# Patient Record
Sex: FEMALE | Race: WHITE | Employment: FULL TIME | URBAN - METROPOLITAN AREA
[De-identification: names, ages, dates, MRNs, and addresses within clinical notes are randomized per-mention and may not be internally consistent; named-entity substitution may affect disease eponyms.]

---

## 2021-05-17 NOTE — PROGRESS NOTES
[facility]  [address], [address]  Phone: [phone]  Fax: [phone]        INITIAL CONSULTATION      HISTORY OF PRESENT ILLNESS:  Abdirahman Molina is a 42 y.o. female whom is referred from Dr. Tess De La Rosa secondary to chronic intermittent low back pain occasionally radiating into the BLE in a S1 distribution to the mid-thighs x 2012 after a sneeze, more constant x 2016. She rates her pain 6/10. Her pain is exacerbated by prolonged sitting and heavy lifting. Additionally, pt reports pain and paraesthesias with discoloration in the digits of the BUE. She has treated with Naprosyn, Lidoderm, flexeril, and Norco with benefit. She treated with Prednisone within the past year with benefit. Pt received a lumbar spinal injection in 2012 at the [facility] with benefit x 1 year. Pt received a lumbar spinal injection in 2016 by Dr. Forest Murrieta without benefit. Pt completed PT in 2016 with benefit. She is inconsistent with her HEP. Patient denies previous spinal surgery or recent physical therapy/chiropractic care. Pt denies fever, weight loss, or skin changes. She denies possibility of pregnancy or breastfeeding. PmHx of back pain, endometriosis, migraine HA's, PCOS. Note from Dr. Tess De La Rosa dated 4/19/2021 indicating patient was seen with c/o myalgias. Recently treated with a 10 days course for lymes disease. started her on diclofenac. Indicated pt had a positive ALYSON. Indicated she has h/o major depressive disorder. Note from Dr. Tess De La Rosa dated 5/4/2021 indicating patient was seen with c/o back pain. Pt had positive ALYSON but no CTD. Indicated she does not think she has an underlying connective tissue disorder. Pt received 2 spinal injections by Dr. Forest Murrieta with improvement. First injection was beneficial for several months. Pt reports the only thing that provides relief is hydrocodone. The patient is RHD.  reviewed. Body mass index is 39.87 kg/m².     PCP: Trung Richardson MD    Past Medical History:   Diagnosis Date    Anxiety     Back pain     Endometriosis     Irregular menses     Migraines     PCOS (polycystic ovarian syndrome)     Pelvic pain     Sleep apnea    l us  Past Surgical History:   Procedure Laterality Date    HX PELVIC LAPAROSCOPY     e: Yes     Alcohol/week: 1.0 standard drinks     Types: 1 Glasses of wine per week       Work status: The patient is employed. Marital status: . Current Outpatient Medications   Medication Sig Dispense Refill    naproxen sodium (Aleve) 220 mg cap Aleve   PRN      loratadine-pseudoephedrine (Claritin-D 12 Hour) 5-120 mg per tablet Claritin-D      lidocaine (LIDODERM) 5 % Apply 1 Patch to affected area as needed.  cyclobenzaprine (FLEXERIL) 10 mg tablet Take 10 mg by mouth every eight (8) hours as needed.  mirtazapine (REMERON) 15 mg tablet Take 15 mg by mouth At bedtime.  hydroCHLOROthiazide (HYDRODIURIL) 25 mg tablet Take 25 mg by mouth daily.  diclofenac EC (VOLTAREN) 75 mg EC tablet Take  by mouth.  HYDROcodone-acetaminophen (NORCO) 7.5-325 mg per tablet Take 1 Tab by mouth every six (6) hours as needed for Pain.          Allergies   Allergen Reactions    Penicillins Other (comments)     Facial swelling            Family History   Problem Relation Age of Onset    Diabetes Mother     Hypertension Mother     Thyroid Disease Mother     Heart Disease Father     Diabetes Father     Hypertension Father     Heart Disease Paternal Grandmother     Diabetes Paternal Grandmother     Hypertension Paternal Grandmother     Heart Disease Paternal Grandfather     Hypertension Paternal Grandfather          REVIEW OF SYSTEMS  Constitutional symptoms: Negative  Eyes: Negative  Ears, Nose, Throat, and Mouth: Negative  Cardiovascular: Negative  Respiratory: Negative  Genitourinary: Negative  Integumentary (Skin and/or breast): Negative  Musculoskeletal: Positive for low back pain. Pain and paraesthesias with discoloration in the BUE digits. Extremities: Negative for edema. Endocrine/Rheumatologic: Negative  Hematologic/Lymphatic: Negative  Allergic/Immunologic: Negative  Psychiatric: Negative       PHYSICAL EXAMINATION  Visit Vitals  BP (!) 138/100 (BP 1 Location: Left upper arm)   Pulse 87   Temp 99.2 °F (37.3 °C)   Resp 19   Ht 5' 1\" (1.549 m)   Wt 211 lb (95.7 kg)   SpO2 98%   BMI 39.87 kg/m²       CONSTITUTIONAL: NAD, A&O x 3  HEART: Regular rate and rhythm  GASTROINTESTINAL: Positive bowel sounds, soft, nontender, and nondistended  LUNGS: Clear to auscultation bilaterally. SKIN: Negative for rash. RANGE OF MOTION: The patient has full passive range of motion in all four extremities. SENSATION: Sensation is intact to light touch throughout. MOTOR:   Straight Leg Raise: Negative, bilateral  Harding: Negative, bilateral  Deep tendon reflexes are 1 at the biceps, 0 at the triceps, and 1 at the brachioradialis bilaterally. Deep tendon reflexes are 0 at the knees and ankles bilaterally. Shoulder AB/Flex Elbow Flex Wrist Ext Elbow Ext Wrist Flex Hand Intrin Tone   Right +4/5 +4/5 +4/5 +4/5 +4/5 +4/5 +4/5   Left +4/5 +4/5 +4/5 +4/5 +4/5 +4/5 +4/5              Hip Flex Knee Ext Knee Flex Ankle DF GTE Ankle PF Tone   Right +4/5 +4/5 +4/5 +4/5 +4/5 +4/5 +4/5   Left +4/5 +4/5 +4/5 +4/5 +4/5 +4/5 +4/5     RADIOGRAPHS  Lumbar spine plain films dated 5/19/2021. 2 views: AP and lateral. Revealed:  Mild disc space narrowing at L3-4, L4-5, and L5-S1. Small anterior osteophytes noted at L2 and L3. No acute pathology identified. ASSESSMENT   Diagnoses and all orders for this visit:    1. Low back pain at multiple sites  -     AMB POC XRAY, SPINE, LUMBOSACRAL; 2 O  -     REFERRAL TO PHYSICAL THERAPY  -     gabapentin (NEURONTIN) 300 mg capsule; Take 1 Capsule by mouth three (3) times daily.  Max Daily Amount: 900 mg.    2. Lumbar spondylosis with myelopathy  -     REFERRAL TO PHYSICAL THERAPY  -     gabapentin (NEURONTIN) 300 mg capsule; Take 1 Capsule by mouth three (3) times daily. Max Daily Amount: 900 mg.    3. DDD (degenerative disc disease), lumbar  -     REFERRAL TO PHYSICAL THERAPY  -     gabapentin (NEURONTIN) 300 mg capsule; Take 1 Capsule by mouth three (3) times daily. Max Daily Amount: 900 mg. IMPRESSIONS/RECOMMENDATIONS:  Patient presents today with c/o low back pain occasionally radiating into the BLE in a S1 distribution to the mid-thighs. Multiple treatment options were discussed. I will have the patient sign a release of medical information to obtain records from Dr. Frank Infante. I will try her on Neurontin 300 mg TID. The risks, benefits, and potential side effects of this medication were discussed. Patient understands and wishes to proceed. Patient advised to call the office if intolerant to new medication. I will refer her to physical therapy with an emphasis on HEP. Patient is neurologically intact. I will see the patient back in 6 week's time or earlier if needed. Written by Florian Fournier, as dictated by Johan Uriarte MD  I examined the patient, reviewed and agree with the note.

## 2021-05-19 ENCOUNTER — OFFICE VISIT (OUTPATIENT)
Dept: ORTHOPEDIC SURGERY | Age: 41
End: 2021-05-19
Payer: COMMERCIAL

## 2021-05-19 VITALS
TEMPERATURE: 99.2 F | SYSTOLIC BLOOD PRESSURE: 138 MMHG | RESPIRATION RATE: 19 BRPM | HEIGHT: 61 IN | HEART RATE: 87 BPM | DIASTOLIC BLOOD PRESSURE: 100 MMHG | OXYGEN SATURATION: 98 % | BODY MASS INDEX: 39.84 KG/M2 | WEIGHT: 211 LBS

## 2021-05-19 DIAGNOSIS — M51.36 DDD (DEGENERATIVE DISC DISEASE), LUMBAR: ICD-10-CM

## 2021-05-19 DIAGNOSIS — M54.50 LOW BACK PAIN AT MULTIPLE SITES: Primary | ICD-10-CM

## 2021-05-19 DIAGNOSIS — M47.16 LUMBAR SPONDYLOSIS WITH MYELOPATHY: ICD-10-CM

## 2021-05-19 PROCEDURE — 99204 OFFICE O/P NEW MOD 45 MIN: CPT | Performed by: PHYSICAL MEDICINE & REHABILITATION

## 2021-05-19 PROCEDURE — 72100 X-RAY EXAM L-S SPINE 2/3 VWS: CPT | Performed by: PHYSICAL MEDICINE & REHABILITATION

## 2021-05-19 RX ORDER — HYDROCHLOROTHIAZIDE 25 MG/1
25 TABLET ORAL DAILY
COMMUNITY

## 2021-05-19 RX ORDER — CYCLOBENZAPRINE HCL 10 MG
10 TABLET ORAL
COMMUNITY
Start: 2020-10-11

## 2021-05-19 RX ORDER — GABAPENTIN 300 MG/1
300 CAPSULE ORAL 3 TIMES DAILY
Qty: 90 CAPSULE | Refills: 1 | Status: SHIPPED | OUTPATIENT
Start: 2021-05-19

## 2021-05-19 RX ORDER — LORATADINE AND PSEUDOEPHEDRINE SULFATE 5; 120 MG/1; MG/1
TABLET, EXTENDED RELEASE ORAL
COMMUNITY

## 2021-05-19 RX ORDER — CHOLECALCIFEROL (VITAMIN D3) 125 MCG
CAPSULE ORAL
COMMUNITY

## 2021-05-19 RX ORDER — LIDOCAINE 50 MG/G
1 PATCH TOPICAL AS NEEDED
COMMUNITY

## 2021-05-19 RX ORDER — NORETHINDRONE ACETATE AND ETHINYL ESTRADIOL, ETHINYL ESTRADIOL AND FERROUS FUMARATE 1MG-10(24)
KIT ORAL
COMMUNITY
End: 2021-05-19 | Stop reason: ALTCHOICE

## 2021-05-19 RX ORDER — MIRTAZAPINE 15 MG/1
15 TABLET, FILM COATED ORAL AT BEDTIME
COMMUNITY

## 2021-05-19 RX ORDER — DICLOFENAC SODIUM 75 MG/1
TABLET, DELAYED RELEASE ORAL
COMMUNITY

## 2021-05-19 NOTE — LETTER
5/19/2021 Patient: Johana Nam YOB: 1980 Date of Visit: 5/19/2021 Fernanda Cadet MD 
[facility] [address] [id number] [address] [address] Via Fax: [phone] Kishor Ballesteros MD 
[facility] [address] [address] [address] [address] [address] Fax: [phone] Dear MD Kishor Salinas MD, Thank you for referring Ms. Serena Joseph to [address] for evaluation. My notes for this consultation are attached. If you have questions, please do not hesitate to call me. I look forward to following your patient along with you. Sincerely, Cisco Stark MD

## 2021-05-27 ENCOUNTER — HOSPITAL ENCOUNTER (OUTPATIENT)
Dept: PHYSICAL THERAPY | Age: 41
Discharge: HOME | End: 2021-05-27
Payer: COMMERCIAL

## 2021-05-27 PROCEDURE — 97535 SELF CARE MNGMENT TRAINING: CPT

## 2021-05-27 PROCEDURE — 97161 PT EVAL LOW COMPLEX 20 MIN: CPT

## 2021-05-27 PROCEDURE — 97110 THERAPEUTIC EXERCISES: CPT

## 2021-05-27 NOTE — PROGRESS NOTES
PT DAILY TREATMENT NOTE     Patient Name: Mi Costa  Date:2021  : 1980  [x]  Patient  Verified  Payor: Maya Anthony / Plan: [place] [employer] / Product Type: HMO /    In time:4:29  Out time:5:10  Total Treatment Time (min): 41  Visit #: 1 of 8    Treatment Area: Low back pain [M54.5]    SUBJECTIVE  Pain Level (0-10 scale): 0  Any medication changes, allergies to medications, adverse drug reactions, diagnosis change, or new procedure performed?: [x] No    [] Yes (see summary sheet for update)  Subjective functional status/changes:   [] No changes reported  The pt reports she has nearly abolished pain since starting Gabapentin    OBJECTIVE    21 min [x]Eval                  []Re-Eval       11 min Therapeutic Exercise:  [] See flow sheet :   Rationale: increase ROM and increase proprioception to improve the patients ability to perform daily tasks     9 min Therapeutic Activity:  []  See flow sheet :   Rationale: pt education and self care   to improve the patients ability to improve self care and management of symptoms         With   [] TE   [] TA   [] neuro   [] other: Patient Education: [x] Review HEP    [] Progressed/Changed HEP based on:   [] positioning   [] body mechanics   [] transfers   [] heat/ice application    [] other:      Other Objective/Functional Measures:      Pain Level (0-10 scale) post treatment: 1    ASSESSMENT/Changes in Function: see POC    Patient will continue to benefit from skilled PT services to modify and progress therapeutic interventions, address functional mobility deficits, address ROM deficits, address strength deficits, analyze and address soft tissue restrictions, analyze and cue movement patterns and analyze and modify body mechanics/ergonomics to attain remaining goals. [x]  See Plan of Care  []  See progress note/recertification  []  See Discharge Summary         Progress towards goals / Updated goals:  Short Term Goals: To be accomplished in 2 weeks:  1.  Pt will demonstrate I and compliance with HEP to promote self management of symptoms. IE: HEP issued and instructed  2. Pt will demonstrate ability to properly PPT without back pain or spasms to improve segmental mobility of lumbar spine. IE: pt reports fear due to previous back spasms  Long Term Goals: To be accomplished in 4 weeks:  1. Pt will demonstrate B hip abd and extension strength 4+/5 without back discomfort to improve ease of standing activity. IE: 4/5 abd  4/5 ext with back pain  2. Pt will demonstrate B JOHANNA position without hip pain to improve lumbopelvic flexibility with ADLs. IE: anterior thigh/hip discomfort  3. Pt will report ability to ambulate >30 minutes without increased back pain to return to walking regimen and improve functional capacity. IE: 20 minute ambulation tolerance  4. Pt will improve FOTO score to 50 to demonstrate improved quality of life and functional capacity.    IE: 28    PLAN  []  Upgrade activities as tolerated     [x]  Continue plan of care  []  Update interventions per flow sheet       []  Discharge due to:_  []  Other:_      Joe Anderson DPT, CMTPT 5/27/2021  5:19 PM    Future Appointments   Date Time Provider Parminder Saunders   6/8/2021  4:30 PM Brianna Escobedo, PTA [facility]   6/10/2021  5:15 PM Roseanna Colon, PTA [facility]   6/15/2021  4:30 PM Jesus Hernandez, PT [facility]   6/18/2021  4:30 PM Eduardo Zeng, PTA [facility]   6/22/2021  5:15 PM Lillian Heingy [facility]   6/24/2021  5:15 PM Grey, [address] [facility]   6/29/2021  4:30 PM Lillian Heingy [facility]   6/30/2021  9:30 AM Liborio Kent MD [facility]   7/1/2021  5:15 PM Lillian Buggy [facility]

## 2021-05-27 NOTE — PROGRESS NOTES
[facility]  [address]  [place], [address]  [phone] fax    Plan of Care/ Statement of Necessity for Physical Therapy Services    Patient name: Mulu Elena Start of Care: 2021   Referral source: Ivan Murillo MD : 1980    Medical Diagnosis: Low back pain [M54.5]  Payor: Keysha Gilliland / Plan: [address] HMO / Product Type: HMO /  Onset [id number]    Treatment Diagnosis: LBP   Prior Hospitalization: see medical history Provider#: [id number]   Medications: Verified on Patient summary List    Comorbidities: Fibromyalgia, depression, arthritis, HTN   Prior Level of Function: Pt with chronic LBP since 2012 limiting activity participation and ADL ease     The Plan of Care and following information is based on the information from the initial evaluation. Assessment/ key information: The pt is a 43 y/o F presenting with c/o LBP of chronic nature. She reports herniating lower 3 lumbar discs following a sneeze in . The pt has had injections with relief in the past, reports fear avoidance with regards to activity due to long nature of her back pain and spasms. She reports start of Gabapentin about 10 days ago that has nearly abolished her symptoms. She does still have some \"soreness\" across her lower lumbar region. Pt reports usually limited lifting, ambulating and sitting ability secondary to back pain. She demonstrates WNL trunk AROM today without issue. Mild tightness into hip ER bilaterally. TTP across lower lumbar/SI region bilaterally. LBP noted with hip extension strength testing and mild hip pain with hip abduction strength testing. The pt presents with signs and symptoms consistent with mechanical back pain. She would benefit from PT to improve pain, strength and functional mechanics to improve overall activity participation and quality of life.     Evaluation Complexity History HIGH Complexity :3+ comorbidities / personal factors will impact the outcome/ POC ; Examination LOW Complexity : 1-2 Standardized tests and measures addressing body structure, function, activity limitation and / or participation in recreation  ;Presentation LOW Complexity : Stable, uncomplicated  ;Clinical Decision Making MEDIUM Complexity : FOTO score of 26-74  Overall Complexity Rating: LOW   Problem List: pain affecting function, decrease ROM, decrease strength, decrease ADL/ functional abilitiies, decrease activity tolerance and decrease flexibility/ joint mobility   Treatment Plan may include any combination of the following: Therapeutic exercise, Therapeutic activities, Neuromuscular re-education, Physical agent/modality, Manual therapy, Patient education, Self Care training and Functional mobility training  Patient / Family readiness to learn indicated by: asking questions, trying to perform skills and interest  Persons(s) to be included in education: patient (P)  Barriers to Learning/Limitations: yes; time  Patient Goal (s): Get back to activity without a setback  Patient Self Reported Health Status: fair  Rehabilitation Potential: good    Short Term Goals: To be accomplished in 2 weeks:  1. Pt will demonstrate I and compliance with HEP to promote self management of symptoms. 2. Pt will demonstrate ability to properly PPT without back pain or spasms to improve segmental mobility of lumbar spine. Long Term Goals: To be accomplished in 4 weeks:  1. Pt will demonstrate B hip abd and extension strength 4+/5 without back discomfort to improve ease of standing activity. 2. Pt will demonstrate B JOHANNA position without hip pain to improve lumbopelvic flexibility with ADLs. 3. Pt will report ability to ambulate >30 minutes without increased back pain to return to walking regimen and improve functional capacity. 4. Pt will improve FOTO score to 50 to demonstrate improved quality of life and functional capacity.     Frequency / Duration: Patient to be seen 2 times per week for 4 weeks. Patient/ Caregiver education and instruction: Diagnosis, prognosis, self care, activity modification and exercises   [x]  Plan of care has been reviewed with EVAN CHAHALT, CMTPT 5/27/2021 5:19 PM    ________________________________________________________________________    I certify that the above Therapy Services are being furnished while the patient is under my care. I agree with the treatment plan and certify that this therapy is necessary.     [address] [place] [address] Signature:____________Date:_________TIME:________     Edison Gracia MD  ** Signature, Date and Time must be completed for valid certification **    Please sign and return to [facility] [address] [place] [address] & [facility]  [address] [place] [address] [address]  [phone] fax

## 2021-06-08 ENCOUNTER — APPOINTMENT (OUTPATIENT)
Dept: PHYSICAL THERAPY | Age: 41
End: 2021-06-08
Payer: COMMERCIAL

## 2021-06-10 ENCOUNTER — APPOINTMENT (OUTPATIENT)
Dept: PHYSICAL THERAPY | Age: 41
End: 2021-06-10
Payer: COMMERCIAL

## 2021-06-15 ENCOUNTER — HOSPITAL ENCOUNTER (OUTPATIENT)
Dept: PHYSICAL THERAPY | Age: 41
Discharge: HOME | End: 2021-06-15
Payer: COMMERCIAL

## 2021-06-15 PROCEDURE — 97110 THERAPEUTIC EXERCISES: CPT

## 2021-06-15 PROCEDURE — 97112 NEUROMUSCULAR REEDUCATION: CPT

## 2021-06-15 NOTE — PROGRESS NOTES
PT DAILY TREATMENT NOTE     Patient Name: Jose Carrion  Date:6/15/2021  : 1980  [x]  Patient  Verified  Payor: John Anaya / Plan: [employer] / Product Type: HMO /    In time: 4:31  Out time:5:29  Total Treatment Time (min): 62  Visit #: 2 of 8      Treatment Area: Low back pain [M54.5]    SUBJECTIVE  Pain Level (0-10 scale): 1-2  Any medication changes, allergies to medications, adverse drug reactions, diagnosis change, or new procedure performed?: [x] No    [] Yes (see summary sheet for update)  Subjective functional status/changes:   [] No changes reported  Pt reports she does not have a lot of pain right now because she was prescribed Gabapentin and it has really taken away a lot of her pain. OBJECTIVE    Modality rationale: decrease inflammation and decrease pain to improve the patients ability to preform daily task with ease.    Min Type Additional Details    [] Estim:  []Unatt       []IFC  []Premod                        []Other:  []w/ice   []w/heat  Position:  Location:    [] Estim: []Att    []TENS instruct  []NMES                    []Other:  []w/US   []w/ice   []w/heat  Position:  Location:    []  Traction: [] Cervical       []Lumbar                       [] Prone          []Supine                       []Intermittent   []Continuous Lbs:  [] before manual  [] after manual    []  Ultrasound: []Continuous   [] Pulsed                           []1MHz   []3MHz W/cm2:  Location:    []  Iontophoresis with dexamethasone         Location: [] Take home patch   [] In clinic   10 [x]  Ice     []  heat  []  Ice massage  []  Laser   []  Anodyne Position:prone  Location: L/S    []  Laser with stim  []  Other:  Position:  Location:    []  Vasopneumatic Device  Pre-treatment girth:  Post-treatment girth:  Measured at (location):  Pressure:       [] lo [] med [] hi   Temperature: [] lo [] med [] hi   [] Skin assessment post-treatment:  []intact []redness- no adverse reaction    []redness - adverse reaction:         33 min Therapeutic Exercise:  [x] See flow sheet :   Rationale: increase ROM, increase strength and improve coordination to improve the patients ability to perform functional task with ease. 15 min Neuromuscular Re-education:  [x]  See flow sheet :   Rationale: increase strength, improve coordination and increase proprioception  to improve the patients ability to perform functional task with ease. With   [] TE   [] TA   [] neuro   [] other: Patient Education: [x] Review HEP    [] Progressed/Changed HEP based on:   [] positioning   [] body mechanics   [] transfers   [] heat/ice application    [] other:      Other Objective/Functional Measures: initiated therex per flowsheet      Pain Level (0-10 scale) post treatment: 0-1 (sore)    ASSESSMENT/Changes in Function:   First f/u session with pt displaying a good tolerance to therex and putting forth good effort with exercises. Pt reports some fear avoidance with exercising due to the medication she is currently taking (gabepentin) masking what she feels is her true pain and true tolerance to that pain. Pt reports non compliance to the HEP and was encouraged to initiate her HEP and to use it as her baseline along with in clinical therapy as to what she is able to tolerate. Pt challenged with S/L clams noting soreness and fatigue in the B hips but no increasing pain in the L/S. Pt reports no increasing L/S pain with therex. Patient will continue to benefit from skilled PT services to modify and progress therapeutic interventions, address functional mobility deficits, address ROM deficits, address strength deficits, analyze and address soft tissue restrictions, analyze and cue movement patterns, analyze and modify body mechanics/ergonomics and assess and modify postural abnormalities to attain remaining goals.      [x]  See Plan of Care  []  See progress note/recertification  []  See Discharge Summary                Progress towards goals / Updated goals:  Short Term Goals: To be accomplished in 2 weeks:  1. Pt will demonstrate I and compliance with HEP to promote self management of symptoms. IE: HEP issued and instructed  Current: not met 6/15/21   2. Pt will demonstrate ability to properly PPT without back pain or spasms to improve segmental mobility of lumbar spine. IE: pt reports fear due to previous back spasms  Long Term Goals: To be accomplished in 4 weeks:  1. Pt will demonstrate B hip abd and extension strength 4+/5 without back discomfort to improve ease of standing activity. IE: 4/5 abd  4/5 ext with back pain  2. Pt will demonstrate B JOHANNA position without hip pain to improve lumbopelvic flexibility with ADLs. IE: anterior thigh/hip discomfort  3. Pt will report ability to ambulate >30 minutes without increased back pain to return to walking regimen and improve functional capacity. IE: 20 minute ambulation tolerance  4. Pt will improve FOTO score to 50 to demonstrate improved quality of life and functional capacity.               IE: 28    PLAN  []  Upgrade activities as tolerated     [x]  Continue plan of care  []  Update interventions per flow sheet       []  Discharge due to:_  []  Other:_      Kuldeep Ruggiero, PTA 6/15/2021  4:59 PM    Future Appointments   Date Time Provider Parminder Nicky   6/18/2021  4:30 PM Taylor Mcdermott, PTA [facility]   6/22/2021  5:15 PM Jacklyn Castillo PTA [facility]   6/24/2021  5:15 PM Stacy Lynch [facility]   6/29/2021  4:30 PM Stacy Lynch [facility]   6/30/2021  9:30 AM Lydia Gonzales MD [facility]   7/1/2021  5:15 PM Catina Razo, PTA [facility]

## 2021-06-18 ENCOUNTER — HOSPITAL ENCOUNTER (OUTPATIENT)
Dept: PHYSICAL THERAPY | Age: 41
Discharge: HOME | End: 2021-06-18
Payer: COMMERCIAL

## 2021-06-18 PROCEDURE — 97110 THERAPEUTIC EXERCISES: CPT

## 2021-06-18 PROCEDURE — 97112 NEUROMUSCULAR REEDUCATION: CPT

## 2021-06-18 NOTE — PROGRESS NOTES
PT DAILY TREATMENT NOTE     Patient Name: Dejah Pérez  Date:2021  : 1980  [x]  Patient  Verified  Payor: Zhao Lynch / Plan: [employer] / Product Type: HMO /    In time:4:33  Out time:5:19  Total Treatment Time (min): 55  Visit #: 3 of 8    Treatment Area: Low back pain [M54.5]    SUBJECTIVE  Pain Level (0-10 scale): 1  Any medication changes, allergies to medications, adverse drug reactions, diagnosis change, or new procedure performed?: [x] No    [] Yes (see summary sheet for update)  Subjective functional status/changes:   [] No changes reported  Pt reports she was sore in the low back after her last visit but she is feeling better today. OBJECTIVE    Modality rationale: decrease inflammation and decrease pain to improve the patients ability to perform ADL's with ease.    Min Type Additional Details    [] Estim:  []Unatt       []IFC  []Premod                        []Other:  []w/ice   []w/heat  Position:  Location:    [] Estim: []Att    []TENS instruct  []NMES                    []Other:  []w/US   []w/ice   []w/heat  Position:  Location:    []  Traction: [] Cervical       []Lumbar                       [] Prone          []Supine                       []Intermittent   []Continuous Lbs:  [] before manual  [] after manual    []  Ultrasound: []Continuous   [] Pulsed                           []1MHz   []3MHz W/cm2:  Location:    []  Iontophoresis with dexamethasone         Location: [] Take home patch   [] In clinic   10 [x]  Ice     []  heat  []  Ice massage  []  Laser   []  Anodyne Position:prone  Location:L/S    []  Laser with stim  []  Other:  Position:  Location:    []  Vasopneumatic Device    []  Right     []  Left  Pre-treatment girth:  Post-treatment girth:  Measured at (location):  Pressure:       [] lo [] med [] hi   Temperature: [] lo [] med [] hi   [] Skin assessment post-treatment:  []intact []redness- no adverse reaction    []redness - adverse reaction:         21 min Therapeutic Exercise:  [x] See flow sheet :   Rationale: increase ROM and increase strength to improve the patients ability to perform functional task with ease. 15 min Neuromuscular Re-education:  [x]  See flow sheet :   Rationale: increase strength, improve coordination and increase proprioception  to improve the patients ability to perform ADL's with ease. With   [] TE   [] TA   [] neuro   [] other: Patient Education: [x] Review HEP    [] Progressed/Changed HEP based on:   [] positioning   [] body mechanics   [] transfers   [] heat/ice application    [] other:      Other Objective/Functional Measures: S/L exercises performed with a pillow under hip for comfort. Pain Level (0-10 scale) post treatment: 1-2 (sore)     ASSESSMENT/Changes in Function:   Improved form with hip hinges this visit with pt displaying increased mobility and ease with exercise. Pt challenged with 1/2 prone hip ext and DK's noting increased discomfort and instability at the B ankles (left>right) but able to perform as prescribed. Improved PPT mechanics noted this visit with pt reporting no pain with exercises. Pt reports she has initiated her HEP and has no questions as this time. Pt reports increased soreness but no increased pain post treatment. Patient will continue to benefit from skilled PT services to modify and progress therapeutic interventions, address functional mobility deficits, address ROM deficits, address strength deficits, analyze and address soft tissue restrictions, analyze and cue movement patterns, analyze and modify body mechanics/ergonomics and assess and modify postural abnormalities to attain remaining goals. [x]  See Plan of Care  []  See progress note/recertification  []  See Discharge Summary         Progress towards goals / Updated goals:  Short Term Goals: To be accomplished in 2 weeks:  1.  Pt will demonstrate I and compliance with HEP to promote self management of symptoms.             BM: HEP issued and instructed  Current: not met 6/15/21   2. Pt will demonstrate ability to properly PPT without back pain or spasms to improve segmental mobility of lumbar spine.              IE: pt reports fear due to previous back spasms  Current: Met 6/18/21 Pt able to PPT with no reported pain for 10 reps  Long Term Goals: To be accomplished in 4 weeks:  1. Pt will demonstrate B hip abd and extension strength 4+/5 without back discomfort to improve ease of standing activity.             ANDREA: 4/5 abd  4/5 ext with back pain  2. Pt will demonstrate B JOHANNA position without hip pain to improve lumbopelvic flexibility with ADLs.             BW: anterior thigh/hip discomfort  3. Pt will report ability to ambulate >30 minutes without increased back pain to return to walking regimen and improve functional capacity.             IE: 20 minute ambulation tolerance  4. Pt will improve FOTO score to 50 to demonstrate improved quality of life and functional capacity.               NN: 55    BPIJ  []  Upgrade activities as tolerated     [x]  Continue plan of care  []  Update interventions per flow sheet       []  Discharge due to:_  []  Other:_      Beny Buenrostro PTA 6/18/2021  4:31 PM    Future Appointments   Date Time Provider Parminder Saunders   6/22/2021  5:15 PM Chanel Rocha PTA [facility]   6/24/2021  5:15 PM Mariusz Barnett [facility]   6/29/2021  4:30 PM Mariusz Barnett [facility]   6/30/2021  9:30 AM Edith Saucedo MD [facility]   7/1/2021  5:15 PM Beck Guerrero PTA [facility]

## 2021-06-22 ENCOUNTER — HOSPITAL ENCOUNTER (OUTPATIENT)
Dept: PHYSICAL THERAPY | Age: 41
Discharge: HOME | End: 2021-06-22
Payer: COMMERCIAL

## 2021-06-22 PROCEDURE — 97112 NEUROMUSCULAR REEDUCATION: CPT

## 2021-06-22 PROCEDURE — 97110 THERAPEUTIC EXERCISES: CPT

## 2021-06-22 NOTE — PROGRESS NOTES
PT DAILY TREATMENT NOTE     Patient Name: Rona Mart  Date:2021  : 1980  [x]  Patient  Verified  Payor: Gwendalyn Nageotte / Plan: [employer] / Product Type: HMO /    In time:5:15  Out time:6:06  Total Treatment Time (min): 51  Visit #: 4 of 8    Treatment Area: Low back pain [M54.5]    SUBJECTIVE  Pain Level (0-10 scale): 3/10  Any medication changes, allergies to medications, adverse drug reactions, diagnosis change, or new procedure performed?: [x] No    [] Yes (see summary sheet for update)  Subjective functional status/changes:   [] No changes reported  \"Today was a rough day, wore the wrong shoes at work. \"    OBJECTIVE    Modality rationale: decrease pain to improve the patients ability to perform ADL's.    Min Type Additional Details    [] Estim:  []Unatt       []IFC  []Premod                        []Other:  []w/ice   []w/heat  Position:  Location:    [] Estim: []Att    []TENS instruct  []NMES                    []Other:  []w/US   []w/ice   []w/heat  Position:  Location:    []  Traction: [] Cervical       []Lumbar                       [] Prone          []Supine                       []Intermittent   []Continuous Lbs:  [] before manual  [] after manual    []  Ultrasound: []Continuous   [] Pulsed                           []1MHz   []3MHz W/cm2:  Location:    []  Iontophoresis with dexamethasone         Location: [] Take home patch   [] In clinic   10 [x]  Ice     []  heat  []  Ice massage  []  Laser   []  Anodyne Position: Prone  Location: Low back    []  Laser with stim  []  Other:  Position:  Location:    []  Vasopneumatic Device    []  Right     []  Left  Pre-treatment girth:  Post-treatment girth:  Measured at (location):  Pressure:       [] lo [] med [] hi   Temperature: [] lo [] med [] hi   [] Skin assessment post-treatment:  []intact []redness- no adverse reaction    []redness - adverse reaction:      25 min Therapeutic Exercise:  [x] See flow sheet :   Rationale: increase ROM and increase strength to improve the patients ability to perform ADL's.    16 min Neuromuscular Re-education:  [x]  See flow sheet : Hip hinges, Trapeze bar series, hook-lying bridges. Rationale: increase strength, improve coordination and increase proprioception  to improve the patients ability to perform functional activities. With   [x] TE   [] TA   [] neuro   [] other: Patient Education: [x] Review HEP    [] Progressed/Changed HEP based on:   [] positioning   [] body mechanics   [] transfers   [] heat/ice application    [] other:       Other Objective/Functional Measures: Cueing for proper core and glute activation with exercises. Added trapeze bar hip 3-way. Pain Level (0-10 scale) post treatment: 0/10    ASSESSMENT/Changes in Function: Pt reports ambulation tolerance of 20', no change at this time. Pt fully participated in treatment and is motivated. Pt presents to be very aware of posture and core. Continue PT to increase core and (B) hip strength to improve standing/walking tolerance and ease of performing functional activities. Patient will continue to benefit from skilled PT services to modify and progress therapeutic interventions, address functional mobility deficits, address ROM deficits, address strength deficits, analyze and cue movement patterns and analyze and modify body mechanics/ergonomics to attain remaining goals. [x]  See Plan of Care  []  See progress note/recertification  []  See Discharge Summary         Progress towards goals / Updated goals:  Short Term Goals: To be accomplished in 2 weeks:  1. Pt will demonstrate I and compliance with HEP to promote self management of symptoms.             HX: HEP issued and instructed  Current: not met 6/15/21; Initiated this morning. 6/22/2021  2.  Pt will demonstrate ability to properly PPT without back pain or spasms to improve segmental mobility of lumbar spine.              IE: pt reports fear due to previous back spasms  Current: Met 6/18/21 Pt able to PPT with no reported pain for 10 reps. Long Term Goals: To be accomplished in 4 weeks:  1. Pt will demonstrate B hip abd and extension strength 4+/5 without back discomfort to improve ease of standing activity.             XN: 4/5 abd  4/5 ext with back pain  2. Pt will demonstrate B JOHANNA position without hip pain to improve lumbopelvic flexibility with ADLs.             ZP: anterior thigh/hip discomfort  3. Pt will report ability to ambulate >30 minutes without increased back pain to return to walking regimen and improve functional capacity.             RP: 20 minute ambulation tolerance   Current: Pt reports ambulation tolerance of 20', no change at this time. 6/22/2021  4. Pt will improve FOTO score to 50 to demonstrate improved quality of life and functional capacity.               HB: 49    NAGK  []  Upgrade activities as tolerated     [x]  Continue plan of care  []  Update interventions per flow sheet       []  Discharge due to:_  []  Other:_      Marcus Chatman PTA 6/22/2021  5:07 PM    Future Appointments   Date Time Provider Parminder Saunders   6/22/2021  5:15 PM Brianna Escobedo, PTA [facility]   6/24/2021  5:15 PM Lillian Echeverria [facility]   6/29/2021  4:30 PM Lillian Echeverria [facility]   6/30/2021  9:30 AM Liborio Kent MD [facility]   7/1/2021  5:15 PM Roseanna Colon, PTA [facility]

## 2021-06-24 ENCOUNTER — HOSPITAL ENCOUNTER (OUTPATIENT)
Dept: PHYSICAL THERAPY | Age: 41
Discharge: HOME | End: 2021-06-24
Payer: COMMERCIAL

## 2021-06-24 PROCEDURE — 97112 NEUROMUSCULAR REEDUCATION: CPT

## 2021-06-24 PROCEDURE — 97110 THERAPEUTIC EXERCISES: CPT

## 2021-06-24 NOTE — PROGRESS NOTES
PT DAILY TREATMENT NOTE     Patient Name: Rona Sawyerfer  Date:2021  : 1980  [x]  Patient  Verified  Payor: Gwendalyn Nageotte / Plan: [employer] / Product Type: HMO /    In time:5:14  Out time:6:03  Total Treatment Time (min): 49  Visit #: 5 of 8    Treatment Area: Low back pain [M54.5]    SUBJECTIVE  Pain Level (0-10 scale): 1/10  Any medication changes, allergies to medications, adverse drug reactions, diagnosis change, or new procedure performed?: [x] No    [] Yes (see summary sheet for update)  Subjective functional status/changes:   [] No changes reported  Pt reports that her back is in minimal pain today because she wore the right shoes to work today. She overall feels she needs to work on her core strength and mobility, but still feels like she is progressing through treatment. Pt  attributes use of gabapetin for major progression of hip/lumbar mobility. She discussed having water retention from sudden increased HTN that has for the most part been managed. However, her back pain and HTN had inhibited her from exercising to lose weight. OBJECTIVE    Modality rationale: decrease inflammation and decrease pain to improve the patients ability to perform ADLs.     Min Type Additional Details    [] Estim:  []Unatt       []IFC  []Premod                        []Other:  []w/ice   []w/heat  Position:  Location:    [] Estim: []Att    []TENS instruct  []NMES                    []Other:  []w/US   []w/ice   []w/heat  Position:  Location:    []  Traction: [] Cervical       []Lumbar                       [] Prone          []Supine                       []Intermittent   []Continuous Lbs:  [] before manual  [] after manual    []  Ultrasound: []Continuous   [] Pulsed                           []1MHz   []3MHz W/cm2:  Location:    []  Iontophoresis with dexamethasone         Location: [] Take home patch   [] In clinic   10 [x]  Ice     []  heat  []  Ice massage  []  Laser   []  Anodyne Position: sitting  Location: B hips    []  Laser with stim  []  Other:  Position:  Location:    []  Vasopneumatic Device    []  Right     []  Left  Pre-treatment girth:  Post-treatment girth:  Measured at (location):  Pressure:       [] lo [] med [] hi   Temperature: [] lo [] med [] hi   [] Skin assessment post-treatment:  []intact []redness- no adverse reaction    []redness - adverse reaction:     24 min Therapeutic Exercise:  [] See flow sheet :   Rationale: increase ROM and increase strength to improve the patients ability to perform ADLs    15 min Neuromuscular Re-education:  []  See flow sheet : trap bar, supine PPT series    Rationale: improve coordination and improve balance  to improve the patients ability to perform functional activities with good core stability. With   [] TE   [] TA   [] neuro   [] other: Patient Education: [x] Review HEP    [] Progressed/Changed HEP based on:   [] positioning   [] body mechanics   [] transfers   [] heat/ice application    [] other:      Other Objective/Functional Measures:   B hip ABD- 5/5  B hip ext- 4+/5   Right JOHANNA less ER than left, void of hip anterior pain  FOTO:      Pain Level (0-10 scale) post treatment: 0/10     ASSESSMENT/Changes in Function: Pt progressed through session well with no cueing needed for any exercises. Her hip strength and flexibility has significantly improved since initial eval. Pt c/o hip pain with single stance exercises, especially with hip extension. Pt will benefit from skilled PT to improve core stability and hip strength and flexibility to improve walking tolerance and ease of ADLs. Patient will continue to benefit from skilled PT services to modify and progress therapeutic interventions, address functional mobility deficits, address ROM deficits, address strength deficits, analyze and address soft tissue restrictions, analyze and cue movement patterns and analyze and modify body mechanics/ergonomics to attain remaining goals. [x]  See Plan of Care  []  See progress note/recertification  []  See Discharge Summary         Progress towards goals / Updated goals:  Short Term Goals: To be accomplished in 2 weeks:  1. Pt will demonstrate I and compliance with HEP to promote self management of symptoms.             LA: HEP issued and instructed  Current: not met 6/15/21; Initiated this morning. 6/22/2021  2. Pt will demonstrate ability to properly PPT without back pain or spasms to improve segmental mobility of lumbar spine.              IE: pt reports fear due to previous back spasms  Current: Met 6/18/21 Pt able to PPT with no reported pain for 10 reps. Long Term Goals: To be accomplished in 4 weeks:  1. Pt will demonstrate B hip abd and extension strength 4+/5 without back discomfort to improve ease of standing activity.             UZ: 4/5 abd  4/5 ext with back pain   Current: Met 5/5 abd and 4+/5 ext without back pain 6/23/2021  2. Pt will demonstrate B JOHANNA position without hip pain to improve lumbopelvic flexibility with ADLs.             TE: anterior thigh/hip discomfort   Current: right hip flexor tighter than left void of anterior hip pain 6/24/2021  3. Pt will report ability to ambulate >30 minutes without increased back pain to return to walking regimen and improve functional capacity.             QB: 20 minute ambulation tolerance              Current: Pt reports ambulation tolerance of 20', no change at this time. 6/22/2021  4. Pt will improve FOTO score to 50 to demonstrate improved quality of life and functional capacity.               WJ: 35   Current: Met 55 6/24/2021    PLAN  [x]  Upgrade activities as tolerated     [x]  Continue plan of care  []  Update interventions per flow sheet       []  Discharge due to:_  []  Other:_      Wendy Del Angel, SPT 6/24/2021  5:21 PM    Future Appointments   Date Time Provider Parminder Saunders   6/29/2021  4:30 PM Rita [facility]   6/30/2021  9:30 AM Rochelle Taylor, MD [facility]   7/1/2021  5:15 PM Latha Ramsey, PTA [facility]

## 2021-06-24 NOTE — PROGRESS NOTES
[facility]  [address]  [place], [address]  [phone] fax    Physical Therapy Progress Note  Patient name: Michelle Buckley Start of Care: 2021   Referral source: Ila Hayes MD : 1980                Medical Diagnosis: Low back pain [M54.5]  Payor: Leona Turner / Plan: [employer] HMO / Product Type: HMO /  Onset REUD:[id number]                Treatment Diagnosis: LBP   Prior Hospitalization: see medical history Provider#: [id number]   Medications: Verified on Patient summary List    Comorbidities: Fibromyalgia, depression, arthritis, HTN   Prior Level of Function: Pt with chronic LBP since  limiting activity participation and ADL ease  Visits from Start of Care: 5    Missed Visits: 0      Established Goals:        Excellent         Good         Limited            None  [x] Increased ROM   []  [x]  []  []  [x] Increased Strength  []  [x]  []  []  [x] Increased Mobility  []  [x]  []  []   [x] Decreased Pain   []  [x]  []  []  [] Decreased Swelling  []  []  []  []    Key Functional Changes: Short Term Goals: To be accomplished in 2 weeks:  1. Pt will demonstrate I and compliance with HEP to promote self management of symptoms.             VX: HEP issued and instructed  Current: not met 6/15/21; Initiated this morning. 2021  2. Pt will demonstrate ability to properly PPT without back pain or spasms to improve segmental mobility of lumbar spine.              IE: pt reports fear due to previous back spasms  Current: Met 21 Pt able to PPT with no reported pain for 10 reps. Long Term Goals: To be accomplished in 4 weeks:  1. Pt will demonstrate B hip abd and extension strength 4+/5 without back discomfort to improve ease of standing activity.             ND: 4/5 abd  4/5 ext with back pain              Current: Met 5/5 abd and 4+/5 ext without back pain 2021  2.  Pt will demonstrate B JOHANNA position without hip pain to improve lumbopelvic flexibility with ADLs.             VS: anterior thigh/hip discomfort              Current: right hip flexor tighter than left void of anterior hip pain 6/24/2021  3. Pt will report ability to ambulate >30 minutes without increased back pain to return to walking regimen and improve functional capacity.             OX: 20 minute ambulation tolerance              Current: Pt reports ambulation tolerance of 20', no change at this time. 6/22/2021  4. Pt will improve FOTO score to 50 to demonstrate improved quality of life and functional capacity.             JA: 35              Current: Met 55 6/24/2021    Updated Goals: to be achieved in 4 weeks:  1. Pt will report ability to ambulate >30 minutes without increased back pain to return to walking regimen and improve functional capacity.             AC: Pt reports ambulation tolerance of 20', no change at this time. 2.  Pt will demonstrate B JOHANNA position with full hip ER without pain to improve lumbopelvic flexibility for ADLs. PN: Right ER less than Left ER void of anterior hip pain   3. Pt will perform 15 leg lowers with 2 red bands in reformer void of anterior pelvic tilt and pain to improve core stability for ease of functional activities. PN: reports difficulty with maintaining core activation with therex   4. Pt will perform 15 trap bar marches bilaterally without hip pain to improve weightbearing tolerance of LE for ease of community ambulation. PN: reports hip pain on WB leg     ASSESSMENT/RECOMMENDATIONS: Pt needs minimal to no cueing throughout therex. Her hip strength and flexibility has significantly improved since initial eval. Pt c/o hip pain with single stance exercises, especially with hip extension. Pt will benefit from skilled PT to improve core stability and hip strength and flexibility to improve walking tolerance and ease of ADLs.      [x]Continue therapy per initial plan/protocol at a frequency of  2 x per week for 4 weeks  []Continue therapy with the following recommended changes:_____________________      _____________________________________________________________________  []Discontinue therapy progressing towards or have reached established goals  []Discontinue therapy due to lack of appreciable progress towards goals  []Discontinue therapy due to lack of attendance or compliance  []Await Physician's recommendations/decisions regarding therapy  []Other:________________________________________________________________    Thank you for this referral.    DEAN Cuevas 6/24/2021 6:19 PM  NOTE TO PHYSICIAN:  PLEASE COMPLETE THE ORDERS BELOW AND   FAX TO [facility]: [phone]  If you are unable to process this request in 24 hours please contact our office: [phone]    ? I have read the above report and request that my patient continue as recommended. ? I have read the above report and request that my patient continue therapy with the following changes/special instructions:__________________________________________________________  ? I have read the above report and request that my patient be discharged from therapy.     Physicians signature: ______________________________Date: ______Time:______     Inderjit Appiah MD

## 2021-06-29 ENCOUNTER — HOSPITAL ENCOUNTER (OUTPATIENT)
Dept: PHYSICAL THERAPY | Age: 41
Discharge: HOME | End: 2021-06-29
Payer: COMMERCIAL

## 2021-06-29 PROCEDURE — 97112 NEUROMUSCULAR REEDUCATION: CPT

## 2021-06-29 PROCEDURE — 97110 THERAPEUTIC EXERCISES: CPT

## 2021-06-29 NOTE — PROGRESS NOTES
[facility]  [address], [place]   Phone: [phone]  Fax: [phone]        PROGRESS NOTE      HISTORY OF PRESENT ILLNESS:  The patient is a 42 y.o. female and was seen today for follow up of chronic intermittent low back pain occasionally radiating into the BLE in a S1 distribution to the mid-thighs x 2012 after a sneeze, more constant x 2016. Her pain is exacerbated by prolonged sitting and heavy lifting. Additionally, pt reports pain and paraesthesias with discoloration in the digits of the BUE. She has treated with Naprosyn, Lidoderm, flexeril, and Norco with benefit. She treated with Prednisone within the past year with benefit. Pt received a lumbar spinal injection in 2012 at the [facility] with benefit x 1 year. Pt received a lumbar spinal injection in 2016 by Dr. Jacky Burgos without benefit. Pt completed PT in 2016 with benefit. She is inconsistent with her HEP. Patient denies previous spinal surgery or recent physical therapy/chiropractic care. Pt denies fever, weight loss, or skin changes. She denies possibility of pregnancy or breastfeeding. The patient is RHD. PmHx of back pain, endometriosis, migraine HA's, PCOS. Note from Dr. Yong Primrose dated 4/19/2021 indicating patient was seen with c/o myalgias. Recently treated with a 10 days course for lymes disease. started her on diclofenac. Indicated pt had a positive ALYSON. Indicated she has h/o major depressive disorder. Note from Dr. Yong Primrose dated 5/4/2021 indicating patient was seen with c/o back pain. Pt had positive ALYSON but no CTD. Indicated she does not think she has an underlying connective tissue disorder. Pt received 2 spinal injections by Dr. Jacky Burgos with improvement. First injection was beneficial for several months. Pt reports the only thing that provides relief is hydrocodone.  Lumbar spine plain films dated 5/19/2021. 2 views: AP and lateral. Revealed: Mild disc space narrowing at L3-4, L4-5, and L5-S1. Small anterior osteophytes noted at L2 and L3. No acute pathology identified. At her last clinical appointment, I had the patient sign a release of medical information to obtain records from Dr. Nadya Garvin. I tried her on Neurontin 300 mg TID. I referred her to physical therapy with an emphasis on HEP. The patient returns today and reports pain location and distribution remains unchanged. She rates her pain 1/10, previously 6/10. Therapy notes reviewed. Pt is currently enrolled in PT with benefit. She is tolerating the Neurontin 300 mg TID with benefit. Pt admits to occasionally taking the Neurontin BID. Pt reports she has an appointment on 10/12/2021 for possible pregnancy planning. Pt denies change in bowel or bladder habits. Note from Dr. Nadya Garvin dated 10/4/2013 indicating patient was seen with c/o left buttocks pain anterior thigh. Previously followed by [facility] pain management. Previous epidurals at [facility] with benefit x 6 months. Note from Dr. Nadya Garvin dated 10/14/2013 indicating patient underwent caudal block. Note from Dr. Nadya Garvin dated 10/28/2013 indicating patient was seen for f/u epidural steroid injection #1. Symptoms are 80% better. Note from Dr. Nadya Garvin dated 7/30/2015 indicating patient was seen with c/o lower back pain. Good response to previous epidurals. Too traumatic to consider doing more. Opted not to pursue additional treatment. MRI showed mild disc space narrowing without stenosis at L3-4 and L4-5.  reviewed. Body mass index is 39.87 kg/m².     PCP: Celi Eden MD      Past Medical History:   Diagnosis Date    Anxiety     Back pain     Endometriosis     Irregular menses     Migraines     PCOS (polycystic ovarian syndrome)     Pelvic pain     Sleep apnea         Social History     Socioeconomic History    Marital status:      Spouse name: Not on file    Number of children: Not on file    Years of education: Not on file    Highest education level: Not on file   Occupational History    Not on file   Tobacco Use    Smoking status: Never Smoker    Smokeless tobacco: Never Used   Substance and Sexual Activity    Alcohol use: Yes     Alcohol/week: 1.0 standard drinks     Types: 1 Glasses of wine per week    Drug use: No    Sexual activity: Not on file   Other Topics Concern    Not on file   Social History Narrative    Not on file     Social Determinants of Health     Financial Resource Strain:     Difficulty of Paying Living Expenses:    Food Insecurity:     Worried About Running Out of Food in the Last Year:     [address] in the Last Year:    Transportation Needs:     Lack of Transportation (Medical):  Lack of Transportation (Non-Medical):    Physical Activity:     Days of Exercise per Week:     Minutes of Exercise per Session:    Stress:     Feeling of Stress :    Social Connections:     Frequency of Communication with Friends and Family:     Frequency of Social Gatherings with Friends and Family:     Attends Protestant Services:     Active Member of Clubs or Organizations:     Attends Club or Organization Meetings:     Marital Status:    Intimate Partner Violence:     Fear of Current or Ex-Partner:     Emotionally Abused:     Physically Abused:     Sexually Abused:        Current Outpatient Medications   Medication Sig Dispense Refill    lidocaine (LIDODERM) 5 % Apply 1 Patch to affected area as needed.  mirtazapine (REMERON) 15 mg tablet Take 15 mg by mouth At bedtime.  hydroCHLOROthiazide (HYDRODIURIL) 25 mg tablet Take 25 mg by mouth daily.  diclofenac EC (VOLTAREN) 75 mg EC tablet Take  by mouth.  gabapentin (NEURONTIN) 300 mg capsule Take 1 Capsule by mouth three (3) times daily.  Max Daily Amount: 900 mg. 90 Capsule 1    naproxen sodium (Aleve) 220 mg cap Aleve   PRN (Patient not taking: Reported on 6/30/2021)      loratadine-pseudoephedrine (Claritin-D 12 Hour) 5-120 mg per tablet Claritin-D  cyclobenzaprine (FLEXERIL) 10 mg tablet Take 10 mg by mouth every eight (8) hours as needed. (Patient not taking: Reported on 6/30/2021)      HYDROcodone-acetaminophen (NORCO) 7.5-325 mg per tablet Take 1 Tab by mouth every six (6) hours as needed for Pain. (Patient not taking: Reported on 6/30/2021)         Allergies   Allergen Reactions    Penicillins Other (comments)     Facial swelling          PHYSICAL EXAMINATION    Visit Vitals  /81   Pulse 80   Temp 98.6 °F (37 °C)   Resp 18   Wt 211 lb (95.7 kg)   SpO2 94%   BMI 39.87 kg/m²       CONSTITUTIONAL: NAD, A&O x 3  SENSATION: Intact to light touch throughout  RANGE OF MOTION: The patient has full passive range of motion in all four extremities. MOTOR:  Straight Leg Raise: Negative, bilateral                 Hip Flex Knee Ext Knee Flex Ankle DF GTE Ankle PF Tone   Right +4/5 +4/5 +4/5 +4/5 +4/5 +4/5 +4/5   Left +4/5 +4/5 +4/5 +4/5 +4/5 +4/5 +4/5       ASSESSMENT   Diagnoses and all orders for this visit:    1. Low back pain at multiple sites    2. Lumbar spondylosis with myelopathy    3. DDD (degenerative disc disease), lumbar      IMPRESSION AND PLAN:  Patient returns to the office today with c/o low back pain occasionally radiating into the BLE in a S1 distribution to the mid-thighs. Multiple treatment options were discussed. She should continue with PT as prescribed and perform her HEP once completed. I provided her refills of Neurontin 300 mg TID. We discussed weaning her off of Neurontin at her next f/u due to pt planning on pregnancy. Patient is neurologically intact. I will see the patient back in 2 month's time or earlier if needed. Written by Nisreen King, as dictated by Javier Pedro MD  I examined the patient, reviewed and agree with the note.

## 2021-06-29 NOTE — PROGRESS NOTES
PT DAILY TREATMENT NOTE     Patient Name: Antonio Gan  Date:2021  : 1980  [x]  Patient  Verified  Payor: Isabel Batista / Plan: [employer] / Product Type: HMO /    In time:4:28  Out time:5:16  Total Treatment Time (min): 48  Visit #: 1 of 8    Treatment Area: Low back pain [M54.5]    SUBJECTIVE  Pain Level (0-10 scale): 0-1/10  Any medication changes, allergies to medications, adverse drug reactions, diagnosis change, or new procedure performed?: [x] No    [] Yes (see summary sheet for update)  Subjective functional status/changes:   [] No changes reported    Pt reports that she pushed herself yesterday at work because she was running and managing a work function. She mentioned walking around and carrying/loading supplies into a vehicle all day. Although she feels tired, she reports that it didn't aggravate her symptoms. OBJECTIVE    Modality rationale: decrease inflammation and decrease pain to improve the patients ability to perform work-related tasks.     Min Type Additional Details    [] Estim:  []Unatt       []IFC  []Premod                        []Other:  []w/ice   []w/heat  Position:  Location:    [] Estim: []Att    []TENS instruct  []NMES                    []Other:  []w/US   []w/ice   []w/heat  Position:  Location:    []  Traction: [] Cervical       []Lumbar                       [] Prone          []Supine                       []Intermittent   []Continuous Lbs:  [] before manual  [] after manual    []  Ultrasound: []Continuous   [] Pulsed                           []1MHz   []3MHz W/cm2:  Location:    []  Iontophoresis with dexamethasone         Location: [] Take home patch   [] In clinic   10 [x]  Ice     []  heat  []  Ice massage  []  Laser   []  Anodyne Position: prone  Location: Low back and left hip       Laser with stim  []  Other:  Position:  Location:    []  Vasopneumatic Device    []  Right     []  Left  Pre-treatment girth:  Post-treatment girth:  Measured at (location): Pressure:       [] lo [] med [] hi   Temperature: [] lo [] med [] hi   [] Skin assessment post-treatment:  []intact []redness- no adverse reaction    []redness - adverse reaction:     23 min Therapeutic Exercise:  [x] See flow sheet :   Rationale: increase ROM and increase strength to improve the patients ability to perform ADLs. 15 min Neuromuscular Re-education:  []  See flow sheet : hip hinges, trap bar exercises, 1/2 prone DK & extension   Rationale: improve coordination, improve balance and increase proprioception  to improve the patients ability to perform functional tasks with core stability. With   [] TE   [] TA   [] neuro   [] other: Patient Education: [x] Review HEP    [] Progressed/Changed HEP based on:   [] positioning   [] body mechanics   [] transfers   [] heat/ice application    [] other:      Other Objective/Functional Measures:      Pain Level (0-10 scale) post treatment: 0/10    ASSESSMENT/Changes in Function: Pt progressed through session well with increased reps on most therex. Held off on hip extension with trap bar because of reported discomfort in low back last session. Pt performed hip extensions and donkey kicks over barrel with no pain. Continue skilled PT to increase LE mobility and core stability to improve ease of ADLs and work-related duties. Patient will continue to benefit from skilled PT services to modify and progress therapeutic interventions, address functional mobility deficits, address ROM deficits, address strength deficits, analyze and address soft tissue restrictions, analyze and cue movement patterns, analyze and modify body mechanics/ergonomics and assess and modify postural abnormalities to attain remaining goals. []  See Plan of Care  []  See progress note/recertification  []  See Discharge Summary         Progress towards goals / Updated goals:  1.  Pt will report ability to ambulate >30 minutes without increased back pain to return to walking regimen and improve functional capacity.             PS: Pt reports ambulation tolerance of 20', no change at this time. 2.  Pt will demonstrate B JOHANNA position with full hip ER without pain to improve lumbopelvic flexibility for ADLs. PN: Right ER less than Left ER void of anterior hip pain   3. Pt will perform 15 leg lowers with 2 red bands in reformer void of anterior pelvic tilt and pain to improve core stability for ease of functional activities. PN: reports difficulty with maintaining core activation with therex   4. Pt will perform 15 trap bar marches bilaterally without hip pain to improve weightbearing tolerance of LE for ease of community ambulation.                PN: reports hip pain on WB leg     PLAN  [x]  Upgrade activities as tolerated     [x]  Continue plan of care  []  Update interventions per flow sheet       []  Discharge due to:_  []  Other:_      Sarah Beth Mann, SPT 6/29/2021  4:33 PM    Future Appointments   Date Time Provider Parminder Saunders   6/30/2021  9:30 AM Cherelle Reddy MD [facility]   7/1/2021  5:15 PM Juan Carlos Rape, PTA [facility]   7/12/2021  6:00 PM Juan Carlos Rape, PTA [facility]   7/14/2021  5:15 PM Amina Organ, PTA [facility]   7/19/2021  4:30 PM Grey, [address] [facility]   7/21/2021  4:30 PM Juan Carlos Rape, PTA [facility]   7/26/2021  5:15 PM Juan Carlos Rape, PTA [facility]   7/28/2021  5:15 PM Amina Organ, PTA [facility]

## 2021-06-30 ENCOUNTER — OFFICE VISIT (OUTPATIENT)
Dept: ORTHOPEDIC SURGERY | Age: 41
End: 2021-06-30
Payer: COMMERCIAL

## 2021-06-30 VITALS
DIASTOLIC BLOOD PRESSURE: 81 MMHG | BODY MASS INDEX: 39.87 KG/M2 | SYSTOLIC BLOOD PRESSURE: 132 MMHG | OXYGEN SATURATION: 94 % | HEART RATE: 80 BPM | WEIGHT: 211 LBS | TEMPERATURE: 98.6 F | RESPIRATION RATE: 18 BRPM

## 2021-06-30 DIAGNOSIS — M54.50 LOW BACK PAIN AT MULTIPLE SITES: Primary | ICD-10-CM

## 2021-06-30 DIAGNOSIS — M51.36 DDD (DEGENERATIVE DISC DISEASE), LUMBAR: ICD-10-CM

## 2021-06-30 DIAGNOSIS — M47.16 LUMBAR SPONDYLOSIS WITH MYELOPATHY: ICD-10-CM

## 2021-06-30 PROCEDURE — 99213 OFFICE O/P EST LOW 20 MIN: CPT | Performed by: PHYSICAL MEDICINE & REHABILITATION

## 2021-06-30 RX ORDER — GABAPENTIN 300 MG/1
300 CAPSULE ORAL 3 TIMES DAILY
Qty: 90 CAPSULE | Refills: 1 | Status: SHIPPED | OUTPATIENT
Start: 2021-06-30

## 2021-06-30 NOTE — LETTER
6/30/2021    Patient: Donal Seip   YOB: 1980   Date of Visit: 6/30/2021     Estela Carrion MD  [address] [address]  Via Fax: [phone]    Dear Estela Carrion MD,      Thank you for referring Ms. Dick Vee to [address] for evaluation. My notes for this consultation are attached. If you have questions, please do not hesitate to call me. I look forward to following your patient along with you.       Sincerely,    Sampson Rodrigues MD

## 2021-07-01 ENCOUNTER — HOSPITAL ENCOUNTER (OUTPATIENT)
Dept: PHYSICAL THERAPY | Age: 41
Discharge: HOME | End: 2021-07-01
Payer: COMMERCIAL

## 2021-07-01 PROCEDURE — 97110 THERAPEUTIC EXERCISES: CPT

## 2021-07-01 PROCEDURE — 97112 NEUROMUSCULAR REEDUCATION: CPT

## 2021-07-01 NOTE — PROGRESS NOTES
PT DAILY TREATMENT NOTE     Patient Name: Ashwini Roldan  Date:2021  : 1980  [x]  Patient  Verified  Payor: Alla Fournier / Plan: [employer] HMO / Product Type: HMO /    In time:5:15  Out time:6:00  Total Treatment Time (min): 45  Visit #: 2 of 8    Treatment Area: Low back pain [M54.5]    SUBJECTIVE  Pain Level (0-10 scale): 0-1/10  Any medication changes, allergies to medications, adverse drug reactions, diagnosis change, or new procedure performed?: [x] No    [] Yes (see summary sheet for update)  Subjective functional status/changes:   [] No changes reported  Pt reports no new complaints of pain. Pt reports she is walking around her apartment complex without difficulty. OBJECTIVE    Modality rationale: decrease inflammation and decrease pain to improve the patients ability to perform ADLs with increased ease.     Min Type Additional Details    [] Estim:  []Unatt       []IFC  []Premod                        []Other:  []w/ice   []w/heat  Position:  Location:    [] Estim: []Att    []TENS instruct  []NMES                    []Other:  []w/US   []w/ice   []w/heat  Position:  Location:    []  Traction: [] Cervical       []Lumbar                       [] Prone          []Supine                       []Intermittent   []Continuous Lbs:  [] before manual  [] after manual    []  Ultrasound: []Continuous   [] Pulsed                           []1MHz   []3MHz W/cm2:  Location:    []  Iontophoresis with dexamethasone         Location: [] Take home patch   [] In clinic   10 [x]  Ice     []  heat  []  Ice massage  []  Laser   []  Anodyne Position:prone  Location: lumbar and left hip    []  Laser with stim  []  Other:  Position:  Location:    []  Vasopneumatic Device    []  Right     []  Left  Pre-treatment girth:  Post-treatment girth:  Measured at (location):  Pressure:       [] lo [] med [] hi   Temperature: [] lo [] med [] hi   [] Skin assessment post-treatment:  []intact []redness- no adverse reaction []redness - adverse reaction:       25 min Therapeutic Exercise:  [x] See flow sheet :   Rationale: increase ROM and increase strength to improve the patients ability to perform ADLs. 10 min Neuromuscular Re-education:  [x]  See flow sheet :   Rationale: increase strength and improve coordination  to improve the patients ability to perform ADL with increased ease. With   [] TE   [] TA   [] neuro   [] other: Patient Education: [x] Review HEP    [] Progressed/Changed HEP based on:   [] positioning   [] body mechanics   [] transfers   [] heat/ice application    [] other:      Other Objective/Functional Measures: Increased resistance with clams and reps with therapeutic exercises as per flow sheet. Pain Level (0-10 scale) post treatment: 0/10    ASSESSMENT/Changes in Function: Pt demonstrates good form and proprioception with all therapeutic exercises. Pt has no adverse reaction to treatment. Patient will continue to benefit from skilled PT services to modify and progress therapeutic interventions, address functional mobility deficits, address ROM deficits, address strength deficits, analyze and address soft tissue restrictions, analyze and cue movement patterns, analyze and modify body mechanics/ergonomics and assess and modify postural abnormalities to attain remaining goals. []  See Plan of Care  []  See progress note/recertification  []  See Discharge Summary         Progress towards goals / Updated goals:  1. Pt will report ability to ambulate >30 minutes without increased back pain to return to walking regimen and improve functional capacity.             PN: Pt reports ambulation tolerance of 20', no change at this time. 2.  Pt will demonstrate B JOHANNA position with full hip ER without pain to improve lumbopelvic flexibility for ADLs.             KR: Right ER less than Left ER void of anterior hip pain   3.  Pt will perform 15 leg lowers with 2 red bands in reformer void of anterior pelvic tilt and pain to improve core stability for ease of functional activities.                PN: reports difficulty with maintaining core activation with therex   4.  Pt will perform 15 trap bar marches bilaterally without hip pain to improve weightbearing tolerance of LE for ease of community ambulation.               PN: reports hip pain on WB leg     PLAN  []  Upgrade activities as tolerated     [x]  Continue plan of care  []  Update interventions per flow sheet       []  Discharge due to:_  []  Other:_      Mervin Buchanan PTA 7/1/2021  5:25 PM    Future Appointments   Date Time Provider Parminder Saunders   7/12/2021  6:00 PM Juan Carlos Waggoner PTA [facility]   7/14/2021  5:15 PM Monet Talley, EVAN [facility]   7/19/2021  4:30 PM [place], [address] [facility]   7/21/2021  4:30 PM Juan Carlos Waggoner, EVAN [facility]   7/26/2021  5:15 PM Juan Carlos Waggoner PTA [facility]   7/28/2021  5:15 PM Monet Talley, PTA [facility]   8/30/2021  8:10 AM Sallie Taylor MD [facility]

## 2021-07-07 ENCOUNTER — HOSPITAL ENCOUNTER (OUTPATIENT)
Dept: PHYSICAL THERAPY | Age: 41
Discharge: HOME | End: 2021-07-07
Payer: COMMERCIAL

## 2021-07-07 PROCEDURE — 97112 NEUROMUSCULAR REEDUCATION: CPT

## 2021-07-07 PROCEDURE — 97110 THERAPEUTIC EXERCISES: CPT

## 2021-07-07 NOTE — PROGRESS NOTES
PT DAILY TREATMENT NOTE     Patient Name: Deshaun Moody  Date:2021  : 1980  [x]  Patient  Verified  Payor: Yulisa Staton / Plan: [employer] / Product Type: HMO /    In time:1:31 PM  Out time:2: 23 PM  Total Treatment Time (min): 46  Visit #: 3 of 8      Treatment Area: Low back pain [M54.5]    SUBJECTIVE  Pain Level (0-10 scale): 0  Any medication changes, allergies to medications, adverse drug reactions, diagnosis change, or new procedure performed?: [x] No    [] Yes (see summary sheet for update)  Subjective functional status/changes:   [x] No changes reported  Reports gradually getting back to walking for exercise. OBJECTIVE    Modality rationale: decrease pain to improve the patients ability to manage post exercise soreness for work task tolerance through remainder of day   Min Type Additional Details   10 [x]  Ice     []  heat  []  Ice massage  []  Laser   []  Anodyne Position: prone  Location: lumbar, left hip     28 min Therapeutic Exercise:  [x] See flow sheet :   Rationale: increase ROM and increase strength to improve the patients ability to perform ADLs with greater tolerance. 14 min Neuromuscular Re-education:  [x]  See flow sheet :   Rationale: increase strength and improve coordination  to improve the patients ability to complete daily chores with improved mechanics and reduced pain. With   [] TE   [] TA   [] neuro   [] other: Patient Education: [x] Review HEP    [] Progressed/Changed HEP based on:   [] positioning   [] body mechanics   [] transfers   [] heat/ice application    [] other:      Other Objective/Functional Measures: Progressed repetitions of 1/2 prone strengthening exercises. Pain Level (0-10 scale) post treatment: 0    ASSESSMENT/Changes in Function: Patient with no adverse effects this session. Demonstrates excellent understanding of body mechanics with all exercises and maintains TA recruitment with minimal cues.      Patient will continue to benefit from skilled PT services to modify and progress therapeutic interventions, address functional mobility deficits, address ROM deficits, address strength deficits, analyze and address soft tissue restrictions, analyze and cue movement patterns and analyze and modify body mechanics/ergonomics to attain remaining goals. Progress towards goals / Updated goals:  1. Pt will report ability to ambulate >30 minutes without increased back pain to return to walking regimen and improve functional capacity.             PN: Pt reports ambulation tolerance of 20', no change at this time. Current: 7/7/2021 - Progressing - Ambulating 1 mile (~20 minutes) without pain, increasing distance progressively  2.  Pt will demonstrate B JOHANNA position with full hip ER without pain to improve lumbopelvic flexibility for ADLs.             ZU: Right ER less than Left ER void of anterior hip pain   3. Pt will perform 15 leg lowers with 2 red bands in reformer void of anterior pelvic tilt and pain to improve core stability for ease of functional activities.                PN: reports difficulty with maintaining core activation with therex   4.  Pt will perform 15 trap bar marches bilaterally without hip pain to improve weightbearing tolerance of LE for ease of community ambulation.               PN: reports hip pain on WB leg     PLAN  [x]  Upgrade activities as tolerated     [x]  Continue plan of care  []  Update interventions per flow sheet       []  Discharge due to:_  []  Other:_      Antony Reece, PT 7/7/2021  1:47 PM    Future Appointments   Date Time Provider Parminder Saunders   7/12/2021  6:00 PM Juan Carlos Rape, PTA [facility]   7/14/2021  5:15 PM Amina Organ, PTA [facility]   7/19/2021  4:30 PM [place], [address] [facility]   7/21/2021  4:30 PM Juan Carlos Rape, PTA [facility]   7/26/2021  5:15 PM Juan Carlos Rape, PTA [facility]   7/28/2021  5:15 PM Amina Organ, PTA [facility]   8/30/2021 8:10 AM John Taylor MD [facility]

## 2021-07-12 ENCOUNTER — HOSPITAL ENCOUNTER (OUTPATIENT)
Dept: PHYSICAL THERAPY | Age: 41
Discharge: HOME | End: 2021-07-12
Payer: COMMERCIAL

## 2021-07-12 PROCEDURE — 97110 THERAPEUTIC EXERCISES: CPT

## 2021-07-12 PROCEDURE — 97112 NEUROMUSCULAR REEDUCATION: CPT

## 2021-07-12 NOTE — PROGRESS NOTES
PT DAILY TREATMENT NOTE     Patient Name: Monserrat Chen  Date:2021  : 1980  [x]  Patient  Verified  Payor: Mae Laird / Plan: [employer] / Product Type: HMO /    In time:5:15  Out time:6:08  Total Treatment Time (min): 48  Visit #: 4 of 8      Treatment Area: Low back pain [M54.5]    SUBJECTIVE  Pain Level (0-10 scale): 0/10  Any medication changes, allergies to medications, adverse drug reactions, diagnosis change, or new procedure performed?: [x] No    [] Yes (see summary sheet for update)  Subjective functional status/changes:   [] No changes reported  Pt reports no new complaints of pain. Pt reports compliance with increased ease. Pt reports she occasionally has to lift 25-30 LB boxes at work.      OBJECTIVE    Modality rationale: decrease inflammation and decrease pain to improve the patients ability to perform    Min Type Additional Details    [] Estim:  []Unatt       []IFC  []Premod                        []Other:  []w/ice   []w/heat  Position:  Location:    [] Estim: []Att    []TENS instruct  []NMES                    []Other:  []w/US   []w/ice   []w/heat  Position:  Location:    []  Traction: [] Cervical       []Lumbar                       [] Prone          []Supine                       []Intermittent   []Continuous Lbs:  [] before manual  [] after manual    []  Ultrasound: []Continuous   [] Pulsed                           []1MHz   []3MHz W/cm2:  Location:    []  Iontophoresis with dexamethasone         Location: [] Take home patch   [] In clinic   10 [x]  Ice     []  heat  []  Ice massage  []  Laser   []  Anodyne Position:prone  Location:lumbar    []  Laser with stim  []  Other:  Position:  Location:    []  Vasopneumatic Device    []  Right     []  Left  Pre-treatment girth:  Post-treatment girth:  Measured at (location):  Pressure:       [] lo [] med [] hi   Temperature: [] lo [] med [] hi   [] Skin assessment post-treatment:  []intact []redness- no adverse reaction    []redness - adverse reaction:     33 min Therapeutic Exercise:  [x] See flow sheet :   Rationale: increase ROM and increase strength to improve the patients ability to perform ADLs      10 min Neuromuscular Re-education:  [x]  See flow sheet :   Rationale: increase strength, improve coordination and increase proprioception  to improve the patients ability to perform ADLs with increased ease          With   [] TE   [] TA   [] neuro   [] other: Patient Education: [x] Review HEP    [] Progressed/Changed HEP based on:   [] positioning   [] body mechanics   [] transfers   [] heat/ice application    [] other:      Other Objective/Functional Measures: Added reformer foot work and bridges. Added squats with KB. Pain Level (0-10 scale) post treatment: 0/10    ASSESSMENT/Changes in Function: Pt demonstrates improved TA strength with ability to perform bridges on reformer with proper form. Pt demonstrates good form with KB squats. Patient will continue to benefit from skilled PT services to modify and progress therapeutic interventions, address functional mobility deficits, address ROM deficits, address strength deficits, analyze and address soft tissue restrictions, analyze and cue movement patterns and analyze and modify body mechanics/ergonomics to attain remaining goals. []  See Plan of Care  []  See progress note/recertification  []  See Discharge Summary         Progress towards goals / Updated goals:  1. Pt will report ability to ambulate >30 minutes without increased back pain to return to walking regimen and improve functional capacity.             PN: Pt reports ambulation tolerance of 20', no change at this time. Current: 7/7/2021 - Progressing - Ambulating 1 mile (~20 minutes) without pain, increasing distance progressively  2.  Pt will demonstrate B JOHANNA position with full hip ER without pain to improve lumbopelvic flexibility for ADLs.                KF: Right ER less than Left ER void of anterior hip pain   3. Pt will perform 15 leg lowers with 2 red bands in reformer void of anterior pelvic tilt and pain to improve core stability for ease of functional activities.                PN: reports difficulty with maintaining core activation with therex   4.  Pt will perform 15 trap bar marches bilaterally without hip pain to improve weightbearing tolerance of LE for ease of community ambulation.               PN: reports hip pain on WB leg     PLAN  []  Upgrade activities as tolerated     [x]  Continue plan of care  []  Update interventions per flow sheet       []  Discharge due to:_  []  Other:_      Joanna Mitchell, EVAN 7/12/2021  5:18 PM    Future Appointments   Date Time Provider Parminder Saunders   7/14/2021  5:15 PM Carlyn Thompson PTA [facility]   7/19/2021  4:30 PM Desiree Londono [facility]   7/21/2021  4:30 PM Janice Farris PTA [facility]   7/26/2021  5:15 PM Janice Farris PTA [facility]   7/28/2021  5:15 PM Carlyn Thompson, PTA [facility]   8/30/2021  8:10 AM Catiie Taylor MD [facility]

## 2021-07-14 ENCOUNTER — HOSPITAL ENCOUNTER (OUTPATIENT)
Dept: PHYSICAL THERAPY | Age: 41
Discharge: HOME | End: 2021-07-14
Payer: COMMERCIAL

## 2021-07-14 PROCEDURE — 97110 THERAPEUTIC EXERCISES: CPT

## 2021-07-14 PROCEDURE — 97112 NEUROMUSCULAR REEDUCATION: CPT

## 2021-07-14 NOTE — PROGRESS NOTES
PT DAILY TREATMENT NOTE     Patient Name: Ashwini Roldan  Date:2021  : 1980  [x]  Patient  Verified  Payor: Alla Fournier / Plan: [employer] / Product Type: HMO /    In time:5:19  Out time:6:11  Total Treatment Time (min): 52  Visit #: 5 of 8    Treatment Area: Low back pain [M54.5]    SUBJECTIVE  Pain Level (0-10 scale): 4/10  Any medication changes, allergies to medications, adverse drug reactions, diagnosis change, or new procedure performed?: [x] No    [] Yes (see summary sheet for update)  Subjective functional status/changes:   [] No changes reported  \"Back is doing okay but my neck is killing me, I think I slept wrong last night. \"    OBJECTIVE    Modality rationale: decrease pain to improve the patients ability to perform ADL's.    Min Type Additional Details    [] Estim:  []Unatt       []IFC  []Premod                        []Other:  []w/ice   []w/heat  Position:  Location:    [] Estim: []Att    []TENS instruct  []NMES                    []Other:  []w/US   []w/ice   []w/heat  Position:  Location:    []  Traction: [] Cervical       []Lumbar                       [] Prone          []Supine                       []Intermittent   []Continuous Lbs:  [] before manual  [] after manual    []  Ultrasound: []Continuous   [] Pulsed                           []1MHz   []3MHz W/cm2:  Location:    []  Iontophoresis with dexamethasone         Location: [] Take home patch   [] In clinic   10 [x]  Ice     []  heat  []  Ice massage  []  Laser   []  Anodyne Position: Prone  Location: Low back    []  Laser with stim  []  Other:  Position:  Location:    []  Vasopneumatic Device    []  Right     []  Left  Pre-treatment girth:  Post-treatment girth:  Measured at (location):  Pressure:       [] lo [] med [] hi   Temperature: [] lo [] med [] hi   [] Skin assessment post-treatment:  []intact []redness- no adverse reaction    []redness - adverse reaction:     27 min Therapeutic Exercise:  [x] See flow sheet : Rationale: increase ROM and increase strength to improve the patients ability to perform ADL's.     25 min Neuromuscular Re-education:  [x]  See flow sheet : Reformer series per flow sheet, trapeze bar. Rationale: increase strength, improve coordination and increase proprioception  to improve the patients ability to perform functional activities. With   [x] TE   [] TA   [] neuro   [] other: Patient Education: [x] Review HEP    [] Progressed/Changed HEP based on:   [] positioning   [] body mechanics   [] transfers   [] heat/ice application    [] other:      Other Objective/Functional Measures: Cueing for proper TA recruitment and mechanics with pilates exercises. Occasional rib flaring present. Pain Level (0-10 scale) post treatment: 0/10    ASSESSMENT/Changes in Function: Fair core recruitment with reformer LE series, able to perform 3-ways 8 reps each while maintaining neutral spine ~50%. Continue PT to further increase core strength/stability to improve ease of performing functional activities. Patient will continue to benefit from skilled PT services to modify and progress therapeutic interventions, address functional mobility deficits, address ROM deficits, address strength deficits, analyze and cue movement patterns and analyze and modify body mechanics/ergonomics to attain remaining goals. [x]  See Plan of Care  []  See progress note/recertification  []  See Discharge Summary         Progress towards goals / Updated goals:  1. Pt will report ability to ambulate >30 minutes without increased back pain to return to walking regimen and improve functional capacity.             PN: Pt reports ambulation tolerance of 20', no change at this time.              Current: 7/7/2021 - Progressing - Ambulating 1 mile (~20 minutes) without pain, increasing distance progressively  2.  Pt will demonstrate B JOHANNA position with full hip ER without pain to improve lumbopelvic flexibility for ADLs.             BQ: Right ER less than Left ER void of anterior hip pain   3. Pt will perform 15 leg lowers with 2 red bands in reformer void of anterior pelvic tilt and pain to improve core stability for ease of functional activities.                PN: reports difficulty with maintaining core activation with therex   Current: Fair core recruitment with reformer LE series, able to perform 3-ways 8 reps each while maintaining neutral spine ~50%. 7/14/2021   4.  Pt will perform 15 trap bar marches bilaterally without hip pain to improve weightbearing tolerance of LE for ease of community ambulation.               PN: reports hip pain on WB leg     PLAN  []  Upgrade activities as tolerated     [x]  Continue plan of care  []  Update interventions per flow sheet       []  Discharge due to:_  []  Other:_      Leelee Lester PTA 7/14/2021  5:35 PM    Future Appointments   Date Time Provider Parminder Foxi   7/19/2021  4:30 PM Grey [address] [facility]   7/21/2021  4:30 PM Stephie Moore PTA [facility]   7/26/2021  5:15 PM [facility]   7/28/2021  5:15 PM [facility]ets[facility] Rios, [facility]   8/30/2021  8:10 AM Bart Taylor MD [facility]

## 2021-07-19 ENCOUNTER — HOSPITAL ENCOUNTER (OUTPATIENT)
Dept: PHYSICAL THERAPY | Age: 41
Discharge: HOME | End: 2021-07-19
Payer: COMMERCIAL

## 2021-07-19 PROCEDURE — 97112 NEUROMUSCULAR REEDUCATION: CPT

## 2021-07-19 PROCEDURE — 97110 THERAPEUTIC EXERCISES: CPT

## 2021-07-19 NOTE — PROGRESS NOTES
PT DAILY TREATMENT NOTE     Patient Name: Mili Novak  Date:2021  : 1980  [x]  Patient  Verified  Payor: Melina Olson / Plan: [employer] / Product Type: HMO /    In time:4:30  Out time:5:16  Total Treatment Time (min): 46  Visit #: 6 of 8    Medicare/BCBS Only   Total Timed Codes (min):  36 1:1 Treatment Time:  36       Treatment Area: Low back pain [M54.5]    SUBJECTIVE  Pain Level (0-10 scale):  2  Any medication changes, allergies to medications, adverse drug reactions, diagnosis change, or new procedure performed?: [x] No    [] Yes (see summary sheet for update)  Subjective functional status/changes:   [] No changes reported  The pt reports she is fatigued lately due to her PCOS but her hips have not been having the bad hurt     OBJECTIVE    Modality rationale: decrease inflammation and decrease pain to improve the patients ability to perform ADLs   Min Type Additional Details    [] Estim:  []Unatt       []IFC  []Premod                        []Other:  []w/ice   []w/heat  Position:  Location:    [] Estim: []Att    []TENS instruct  []NMES                    []Other:  []w/US   []w/ice   []w/heat  Position:  Location:    []  Traction: [] Cervical       []Lumbar                       [] Prone          []Supine                       []Intermittent   []Continuous Lbs:  [] before manual  [] after manual    []  Ultrasound: []Continuous   [] Pulsed                           []1MHz   []3MHz W/cm2:  Location:    []  Iontophoresis with dexamethasone         Location: [] Take home patch   [] In clinic   10 [x]  Ice     []  heat  []  Ice massage  []  Laser   []  Anodyne Position: prone  Location: lumbar    []  Laser with stim  []  Other:  Position:  Location:    []  Vasopneumatic Device    []  Right     []  Left  Pre-treatment girth:  Post-treatment girth:  Measured at (location):  Pressure:       [] lo [] med [] hi   Temperature: [] lo [] med [] hi   [] Skin assessment post-treatment:  []intact []redness- no adverse reaction    []redness - adverse reaction:       21 min Therapeutic Exercise:  [] See flow sheet :   Rationale: increase ROM and increase strength to improve the patients ability to perform daily tasks and self care    15 min Neuromuscular Re-education:  []  See flow sheet :   Rationale: increase strength, improve coordination and increase proprioception  to improve the patients ability to perform daily tasks with improved mechanics and self care          With   [] TE   [] TA   [] neuro   [] other: Patient Education: [x] Review HEP    [] Progressed/Changed HEP based on:   [] positioning   [] body mechanics   [] transfers   [] heat/ice application    [] other:      Other Objective/Functional Measures: pt still tight into right hip JOHANNA compared to left     Pain Level (0-10 scale) post treatment: 2    ASSESSMENT/Changes in Function: The pt was able to complete therex today despite reporting increased fatigue and pain from her PCOS the past few days. She is still challenged with clamshells but wanted to push through her set. Pt demonstrating good progression with functional exercise and core stability work. Continue with POC progressing as able    Patient will continue to benefit from skilled PT services to modify and progress therapeutic interventions, address functional mobility deficits, address ROM deficits, address strength deficits, analyze and address soft tissue restrictions, analyze and cue movement patterns and analyze and modify body mechanics/ergonomics to attain remaining goals. []  See Plan of Care  []  See progress note/recertification  []  See Discharge Summary         Progress towards goals / Updated goals:  1. Pt will report ability to ambulate >30 minutes without increased back pain to return to walking regimen and improve functional capacity.               PN: Pt reports ambulation tolerance of 20', no change at this time.              Current: 7/7/2021 - Progressing - Ambulating 1 mile (~20 minutes) without pain, increasing distance progressively  2.  Pt will demonstrate B JOHANNA position with full hip ER without pain to improve lumbopelvic flexibility for ADLs.             RU: Right ER less than Left ER void of anterior hip pain    Current: slow progress- right still limited with reports of lateral tightness 7/19/2021  3. Pt will perform 15 leg lowers with 2 red bands in reformer void of anterior pelvic tilt and pain to improve core stability for ease of functional activities.                PN: reports difficulty with maintaining core activation with therex              Current: Fair core recruitment with reformer LE series, able to perform 3-ways 8 reps each while maintaining neutral spine ~50%. 7/14/2021   4.  Pt will perform 15 trap bar marches bilaterally without hip pain to improve weightbearing tolerance of LE for ease of community ambulation.               PN: reports hip pain on WB leg     PLAN  []  Upgrade activities as tolerated     []  Continue plan of care  []  Update interventions per flow sheet       []  Discharge due to:_  []  Other:_      Magda Zeng DPT [facility] 7/19/2021  4:34 PM    Future Appointments   Date Time Provider Parminder Saunders   7/21/2021  4:30 PM [facility]   7/26/2021  5:15 PM [facility] Dam[facility]   7/28/2021  5:15 PM [facility] Del [facility]   8/30/2021  8:10 AM Roseanne Taylor MD [facility]

## 2021-07-21 ENCOUNTER — HOSPITAL ENCOUNTER (OUTPATIENT)
Dept: PHYSICAL THERAPY | Age: 41
Discharge: HOME | End: 2021-07-21
Payer: COMMERCIAL

## 2021-07-21 PROCEDURE — 97110 THERAPEUTIC EXERCISES: CPT

## 2021-07-21 PROCEDURE — 97112 NEUROMUSCULAR REEDUCATION: CPT

## 2021-07-21 NOTE — PROGRESS NOTES
PT DAILY TREATMENT NOTE     Patient Name: Mya Wong  Date:2021  : 1980  [x]  Patient  Verified  Payor: Alfredo Morse / Plan: [employer] / Product Type: HMO /    In time:4:30  Out time:5:15  Total Treatment Time (min): 45  Visit #: 7 of 8    Treatment Area: Low back pain [M54.5]    SUBJECTIVE  Pain Level (0-10 scale): 1/10  Any medication changes, allergies to medications, adverse drug reactions, diagnosis change, or new procedure performed?: [x] No    [] Yes (see summary sheet for update)  Subjective functional status/changes:   [] No changes reported  Pt reports compliance with HEP. Pt reports no new complaints of pain. OBJECTIVE    Modality rationale: decrease inflammation and decrease pain to improve the patients ability to perform ADLs with increased ease.     Min Type Additional Details    [] Estim:  []Unatt       []IFC  []Premod                        []Other:  []w/ice   []w/heat  Position:  Location:    [] Estim: []Att    []TENS instruct  []NMES                    []Other:  []w/US   []w/ice   []w/heat  Position:  Location:    []  Traction: [] Cervical       []Lumbar                       [] Prone          []Supine                       []Intermittent   []Continuous Lbs:  [] before manual  [] after manual    []  Ultrasound: []Continuous   [] Pulsed                           []1MHz   []3MHz W/cm2:  Location:    []  Iontophoresis with dexamethasone         Location: [] Take home patch   [] In clinic   10 [x]  Ice     []  heat  []  Ice massage  []  Laser   []  Anodyne Position: prone  Location: lumbar    []  Laser with stim  []  Other:  Position:  Location:    []  Vasopneumatic Device    []  Right     []  Left  Pre-treatment girth:  Post-treatment girth:  Measured at (location):  Pressure:       [] lo [] med [] hi   Temperature: [] lo [] med [] hi   [] Skin assessment post-treatment:  []intact []redness- no adverse reaction    []redness - adverse reaction:     25 min Therapeutic Exercise:  [x] See flow sheet :   Rationale: increase ROM and increase strength to improve the patients ability to perform ADLs with increased ease. 10 min Neuromuscular Re-education:  [x]  See flow sheet :   Rationale: increase strength, improve coordination and increase proprioception  to improve the patients ability to perform ADLs with increased ease. With   [] TE   [] TA   [] neuro   [] other: Patient Education: [x] Review HEP    [] Progressed/Changed HEP based on:   [] positioning   [] body mechanics   [] transfers   [] heat/ice application    [] other:      Other Objective/Functional Measures: Pt demonstrates good form with feet in straps exercises with ability to maintain neutral spinal alignment. Pain Level (0-10 scale) post treatment: 0/10    ASSESSMENT/Changes in Function: Pt continues to demonstrate improved functional mobility and trunk stability with pilates based exercises. Patient will continue to benefit from skilled PT services to modify and progress therapeutic interventions, address functional mobility deficits, address ROM deficits, address strength deficits, analyze and address soft tissue restrictions, analyze and cue movement patterns and analyze and modify body mechanics/ergonomics to attain remaining goals. []  See Plan of Care  []  See progress note/recertification  []  See Discharge Summary         Progress towards goals / Updated goals:  1. Pt will report ability to ambulate >30 minutes without increased back pain to return to walking regimen and improve functional capacity.             PN: Pt reports ambulation tolerance of 20', no change at this time.              Current: 7/7/2021 - Progressing - Ambulating 1 mile (~20 minutes) without pain, increasing distance progressively  2.  Pt will demonstrate B JOHANNA position with full hip ER without pain to improve lumbopelvic flexibility for ADLs.                RU: Right ER less than Left ER void of anterior hip pain               Current: slow progress- right still limited with reports of lateral tightness 7/19/2021  3. Pt will perform 15 leg lowers with 2 red bands in reformer void of anterior pelvic tilt and pain to improve core stability for ease of functional activities.                PN: reports difficulty with maintaining core activation with therex              XIVITVT: Fair core recruitment with reformer LE series, able to perform 3-ways 8 reps each while maintaining neutral spine ~50%. 7/14/2021   4. Pt will perform 15 trap bar marches bilaterally without hip pain to improve weightbearing tolerance of LE for ease of community ambulation.               PN: reports hip pain on WB leg     PLAN  []  Upgrade activities as tolerated     [x]  Continue plan of care  []  Update interventions per flow sheet       []  Discharge due to:_  []  Other:_      Raquel Barrera PTA 7/21/2021  4:36 PM    Future Appointments   Date Time Provider Parminder Saunders   7/26/2021  5:15 PM Obey Kim PTA [facility]   7/28/2021  5:15 PM Julia Goins PTA [facility]   8/30/2021  8:10 AM Gabriel Taylor MD [facility]

## 2021-07-26 ENCOUNTER — HOSPITAL ENCOUNTER (OUTPATIENT)
Dept: PHYSICAL THERAPY | Age: 41
Discharge: HOME | End: 2021-07-26
Payer: COMMERCIAL

## 2021-07-26 PROCEDURE — 97110 THERAPEUTIC EXERCISES: CPT

## 2021-07-26 PROCEDURE — 97112 NEUROMUSCULAR REEDUCATION: CPT

## 2021-07-26 NOTE — PROGRESS NOTES
PT DAILY TREATMENT NOTE     Patient Name: Ewa Aguirre  Date:2021  : 1980  [x]  Patient  Verified  Payor: Lima Vazquez / Plan: C[employer] HMO / Product Type: HMO /    In time:5:04  Out time:5:56  Total Treatment Time (min): 52  Visit #: 8 of 8    Medicare/BCBS Only   Total Timed Codes (min):  42 1:1 Treatment Time:  42       Treatment Area: Low back pain [M54.5]    SUBJECTIVE  Pain Level (0-10 scale): 1/10  Any medication changes, allergies to medications, adverse drug reactions, diagnosis change, or new procedure performed?: [x] No    [] Yes (see summary sheet for update)  Subjective functional status/changes:   [] No changes reported  Pt reports she had to lift some boxes at work today and though it was difficult she isn't experiencing any increased pain currently. OBJECTIVE    Modality rationale: decrease inflammation and decrease pain to improve the patients ability to perform ADLs with increased ease.     Min Type Additional Details    [] Estim:  []Unatt       []IFC  []Premod                        []Other:  []w/ice   []w/heat  Position:  Location:    [] Estim: []Att    []TENS instruct  []NMES                    []Other:  []w/US   []w/ice   []w/heat  Position:  Location:    []  Traction: [] Cervical       []Lumbar                       [] Prone          []Supine                       []Intermittent   []Continuous Lbs:  [] before manual  [] after manual    []  Ultrasound: []Continuous   [] Pulsed                           []1MHz   []3MHz W/cm2:  Location:    []  Iontophoresis with dexamethasone         Location: [] Take home patch   [] In clinic   10 [x]  Ice     []  heat  []  Ice massage  []  Laser   []  Anodyne Position:prone  Location:lumbar    []  Laser with stim  []  Other:  Position:  Location:    []  Vasopneumatic Device    []  Right     []  Left  Pre-treatment girth:  Post-treatment girth:  Measured at (location):  Pressure:       [] lo [] med [] hi   Temperature: [] lo [] med [] hi [] Skin assessment post-treatment:  []intact []redness- no adverse reaction    []redness - adverse reaction:     32 min Therapeutic Exercise:  [x] See flow sheet :   Rationale: increase ROM and increase strength to improve the patients ability to perform ADLs with increased ease. 10 min Neuromuscular Re-education:  [x]  See flow sheet :   Rationale: increase strength, improve coordination and increase proprioception  to improve the patients ability to perform ADLs with increased ease. With   [] TE   [] TA   [] neuro   [] other: Patient Education: [x] Review HEP    [] Progressed/Changed HEP based on:   [] positioning   [] body mechanics   [] transfers   [] heat/ice application    [] other:      Other Objective/Functional Measures: issued and reviewed updated HEP. Pain Level (0-10 scale) post treatment: 1/10    ASSESSMENT/Changes in Function: Pt is making good progress toward goals but due to financial reasons will need to discontinue PT. Pt acknowledged understanding of updated exercises and plans to perform them to further progress. Pt also plans to participate in personal training after DC.      []  See Plan of Care  []  See progress note/recertification  [x]  See Discharge Summary         Progress towards goals / Updated goals:  1. Pt will report ability to ambulate >30 minutes without increased back pain to return to walking regimen and improve functional capacity.             PN: Pt reports ambulation tolerance of 20', no change at this time.              Current: Met per patient report. 07/26/2021   2.  Pt will demonstrate B JOHANNA position with full hip ER without pain to improve lumbopelvic flexibility for ADLs.             XY: Right ER less than Left ER void of anterior hip pain               Current: slow progress- right still limited with reports of lateral tightness 7/19/2021  3.  Pt will perform 15 leg lowers with 2 red bands in reformer void of anterior pelvic tilt and pain to improve core stability for ease of functional activities.                PN: reports difficulty with maintaining core activation with therex              NBGRKBW: Pt demonstrates great progress with leg lowering exercise with good control using 1R1W springs. 4. Pt will perform 15 trap bar marches bilaterally without hip pain to improve weightbearing tolerance of LE for ease of community ambulation.               PN: reports hip pain on WB leg    Current: met.   Pt able to perform trap bar exercises without increased pain. 07/26/2021    PLAN  []  Upgrade activities as tolerated     []  Continue plan of care  []  Update interventions per flow sheet       [x]  Discharge due to:   []  Other:_      Bonnie Wang, EVAN 7/26/2021  5:01 PM    Future Appointments   Date Time Provider Parminder Saunders   7/26/2021  5:15 PM Anabel Siegel, PTA [facility]   7/28/2021  5:15 PM Yojana Ortiz PTA [facility]   8/30/2021  8:10 AM Clair Taylor MD [facility]

## 2021-07-28 ENCOUNTER — APPOINTMENT (OUTPATIENT)
Dept: PHYSICAL THERAPY | Age: 41
End: 2021-07-28
Payer: COMMERCIAL

## 2021-08-03 NOTE — PROGRESS NOTES
[facility] [address]  [place], [address]  [phone] fax    Physical Therapy Discharge Summary  Patient name: Ewa Aguirre Start of Care: 2021   Referral source: Donna Pastor MD : 1980                Medical Diagnosis: Low back pain [M54.5]  Payor: Lima Vazquez / Plan: [address] HMO / Product Type: HMO /  Onset [id number]                Treatment Diagnosis: LBP   Prior Hospitalization: see medical history Provider#: [id number]   Medications: Verified on Patient summary List   Comorbidities: Fibromyalgia, depression, arthritis, HTN  Prior Level of Function: Pt with chronic LBP since  limiting activity participation and ADL ease    Visits from Start of Care: 13    Missed Visits: 0  Reporting Period : 2021 to 2021      Summary of Care:  1. Pt will report ability to ambulate >30 minutes without increased back pain to return to walking regimen and improve functional capacity. PN: Pt reports ambulation tolerance of 20', no change at this time. Current: Met per patient report. 2.  Pt will demonstrate B JOHANNA position with full hip ER without pain to improve lumbopelvic flexibility for ADLs. PN: Right ER less than Left ER void of anterior hip pain               Current: slow progress- right still limited with reports of lateral tightness   3. Pt will perform 15 leg lowers with 2 red bands in reformer void of anterior pelvic tilt and pain to improve core stability for ease of functional activities. PN: reports difficulty with maintaining core activation with therex              Current: Pt demonstrates great progress with leg lowering exercise with good control using 1R1W springs. 4. Pt will perform 15 trap bar marches bilaterally without hip pain to improve weightbearing tolerance of LE for ease of community ambulation.                PN: reports hip pain on WB leg Current: met. Pt able to perform trap bar exercises without increased pain. ASSESSMENT/RECOMMENDATIONS:  Pt is making good progress toward goals but due to financial reasons will need to discontinue PT. Pt acknowledged understanding of updated exercises and plans to perform them to further progress.   Pt also plans to participate in personal training after DC.     [x]Discontinue therapy: [x]Patient has reached or is progressing toward set goals      []Patient is non-compliant or has abdicated      []Due to lack of appreciable progress towards set goals    Ramo Montes, PTA 8/3/2021 7:50 AM  Co-Sign: Jakub Reddy DPT CMTPT

## 2021-08-27 NOTE — PROGRESS NOTES
[facility]  [address], [place]   Phone: [phone]  Fax: [phone]        PROGRESS NOTE      HISTORY OF PRESENT ILLNESS:  The patient is a 42 y.o. female and was seen today for follow up of chronic intermittent low back pain occasionally radiating into the BLE in a S1 distribution to the mid-thighs x 2012 after a sneeze, more constant x 2016. Her pain is exacerbated by prolonged sitting and heavy lifting. Additionally, pt reports pain and paraesthesias with discoloration in the digits of the BUE. She has treated with Naprosyn, Lidoderm, flexeril, and Norco with benefit. She treated with Prednisone within the past year with benefit. Pt received a lumbar spinal injection in 2012 at [facility] with benefit x 1 year. Pt received a lumbar spinal injection in 2016 by Dr. Celia Vaughn without benefit. Pt completed PT in 2016 with benefit. She is inconsistent with her HEP. Patient denies previous spinal surgery or recent physical therapy/chiropractic care. Pt denies fever, weight loss, or skin changes. She denies possibility of pregnancy or breastfeeding. The patient is RHD. PmHx of back pain, endometriosis, migraine HA's, PCOS. Note from Dr. Bryant Bazzi dated 4/19/2021 indicating patient was seen with c/o myalgias. Recently treated with a 10 days course for lymes disease. started her on diclofenac. Indicated pt had a positive ALYSON. Indicated she has h/o major depressive disorder. Note from Dr. Bryant Bazzi dated 5/4/2021 indicating patient was seen with c/o back pain. Pt had positive ALYSON but no CTD. Indicated she does not think she has an underlying connective tissue disorder. Pt received 2 spinal injections by Dr. Celia Vaughn with improvement. First injection was beneficial for several months. Pt reports the only thing that provides relief is hydrocodone. Note from Dr. Celia Vaughn dated 10/4/2013 indicating patient was seen with c/o left buttocks pain anterior thigh. Previously followed by [facility] pain management. Previous epidurals at [facility] with benefit x 6 months. Note from Dr. Katja Flores dated 10/14/2013 indicating patient underwent caudal block. Note from Dr. Katja Flores dated 10/28/2013 indicating patient was seen for f/u epidural steroid injection #1. Symptoms are 80% better. Note from Dr. Katja Flores dated 7/30/2015 indicating patient was seen with c/o lower back pain. Good response to previous epidurals. Too traumatic to consider doing more. Opted not to pursue additional treatment. MRI showed mild disc space narrowing without stenosis at L3-4 and L4-5. Lumbar spine plain films dated 5/19/2021. 2 views: AP and lateral. Revealed: Mild disc space narrowing at L3-4, L4-5, and L5-S1. Small anterior osteophytes noted at L2 and L3. No acute pathology identified. At her last clinical appointment, she should have continued with PT as prescribed and perform her HEP once completed. I provided her refills of Neurontin 300 mg TID. We had discussed weaning her off of Neurontin at her next f/u due to pt planning on pregnancy.       The patient returns today and reports pain location and distribution remains unchanged. She rates her pain 0-2/10, previously 1/10. Despite her pain score, she notes her pain is less intense in nature. Pt reports she has completed PT and admits to preforming her HEP daily. She additionally reports preforming a low impact exercise program with a 10 Ibs weight loss. She continues taking Neurontin 300 mg TID. Pt reports she has an appointment on 10/12/2021 for possible pregnancy planning. Pt denies change in bowel or bladder habits.  reviewed. Body mass index is 37.13 kg/m².     PCP: Matilde Ramos MD      Past Medical History:   Diagnosis Date    Anxiety     Back pain     Endometriosis     Irregular menses     Migraines     PCOS (polycystic ovarian syndrome)     Pelvic pain     Sleep apnea         Social History     Socioeconomic History    Marital status:      Spouse name: Not on file    Number of children: Not on file    Years of education: Not on file    Highest education level: Not on file   Occupational History    Not on file   Tobacco Use    Smoking status: Never Smoker    Smokeless tobacco: Never Used   Substance and Sexual Activity    Alcohol use: Yes     Alcohol/week: 1.0 standard drinks     Types: 1 Glasses of wine per week    Drug use: No    Sexual activity: Not on file   Other Topics Concern    Not on file   Social History Narrative    Not on file     Social Determinants of Health     Financial Resource Strain:     Difficulty of Paying Living Expenses:    Food Insecurity:     Worried About Running Out of Food in the Last Year:     [address] in the Last Year:    Transportation Needs:     Lack of Transportation (Medical):  Lack of Transportation (Non-Medical):    Physical Activity:     Days of Exercise per Week:     Minutes of Exercise per Session:    Stress:     Feeling of Stress :    Social Connections:     Frequency of Communication with Friends and Family:     Frequency of Social Gatherings with Friends and Family:     Attends [employer] Services:     Active Member of Clubs or Organizations:     Attends Club or Organization Meetings:     Marital Status:    Intimate Partner Violence:     Fear of Current or Ex-Partner:     Emotionally Abused:     Physically Abused:     Sexually Abused:        Current Outpatient Medications   Medication Sig Dispense Refill    gabapentin (NEURONTIN) 300 mg capsule Take 1 Capsule by mouth three (3) times daily. Max Daily Amount: 900 mg. 90 Capsule 1    lidocaine (LIDODERM) 5 % Apply 1 Patch to affected area as needed.  hydroCHLOROthiazide (HYDRODIURIL) 25 mg tablet Take 25 mg by mouth daily.  diclofenac EC (VOLTAREN) 75 mg EC tablet Take  by mouth.       naproxen sodium (Aleve) 220 mg cap Aleve   PRN (Patient not taking: Reported on 6/30/2021)      loratadine-pseudoephedrine (Claritin-D 12 Hour) 5-120 mg per tablet Claritin-D      cyclobenzaprine (FLEXERIL) 10 mg tablet Take 10 mg by mouth every eight (8) hours as needed. (Patient not taking: Reported on 6/30/2021)      mirtazapine (REMERON) 15 mg tablet Take 15 mg by mouth At bedtime.  gabapentin (NEURONTIN) 300 mg capsule Take 1 Capsule by mouth three (3) times daily. Max Daily Amount: 900 mg. 90 Capsule 1    HYDROcodone-acetaminophen (NORCO) 7.5-325 mg per tablet Take 1 Tab by mouth every six (6) hours as needed for Pain. (Patient not taking: Reported on 6/30/2021)         Allergies   Allergen Reactions    Penicillins Other (comments)     Facial swelling          PHYSICAL EXAMINATION    Visit Vitals  /87 (BP 1 Location: Left upper arm, BP Patient Position: Sitting, BP Cuff Size: Large adult)   Pulse 74   Temp 97.9 °F (36.6 °C) (Skin)   Ht 5' 2\" (1.575 m)   Wt 203 lb (92.1 kg)   SpO2 98%   BMI 37.13 kg/m²       CONSTITUTIONAL: NAD, A&O x 3  SENSATION: Intact to light touch throughout  RANGE OF MOTION: The patient has full passive range of motion in all four extremities. MOTOR:  Straight Leg Raise: Negative, bilateral               Hip Flex Knee Ext Knee Flex Ankle DF GTE Ankle PF Tone   Right +4/5 +4/5 +4/5 +4/5 +4/5 +4/5 +4/5   Left +4/5 +4/5 +4/5 +4/5 +4/5 +4/5 +4/5       ASSESSMENT   Diagnoses and all orders for this visit:    1. Low back pain at multiple sites    2. Lumbar spondylosis with myelopathy    3. DDD (degenerative disc disease), lumbar      IMPRESSION AND PLAN:  Patient returns to the office today with c/o chronic intermittent low back pain occasionally radiating into the BLE in a S1 distribution to the mid-thighs. Multiple treatment options were discussed. I will wean her off the Neurontin 300 mg TID secondary to pregnancy planning. I encouraged her to continue to perform her daily HEP. Patient is neurologically intact. I will see the patient back prn.       Written by Louis Steele Jose Luis Segovia, as dictated by Yovana Ba MD  I examined the patient, reviewed and agree with the note.

## 2021-08-30 ENCOUNTER — OFFICE VISIT (OUTPATIENT)
Dept: ORTHOPEDIC SURGERY | Age: 41
End: 2021-08-30
Payer: COMMERCIAL

## 2021-08-30 VITALS
TEMPERATURE: 97.9 F | DIASTOLIC BLOOD PRESSURE: 87 MMHG | OXYGEN SATURATION: 98 % | BODY MASS INDEX: 37.36 KG/M2 | WEIGHT: 203 LBS | HEART RATE: 74 BPM | SYSTOLIC BLOOD PRESSURE: 130 MMHG | HEIGHT: 62 IN

## 2021-08-30 DIAGNOSIS — M54.50 LOW BACK PAIN AT MULTIPLE SITES: Primary | ICD-10-CM

## 2021-08-30 DIAGNOSIS — M51.36 DDD (DEGENERATIVE DISC DISEASE), LUMBAR: ICD-10-CM

## 2021-08-30 DIAGNOSIS — M47.16 LUMBAR SPONDYLOSIS WITH MYELOPATHY: ICD-10-CM

## 2021-08-30 PROCEDURE — 99213 OFFICE O/P EST LOW 20 MIN: CPT | Performed by: PHYSICAL MEDICINE & REHABILITATION

## 2021-08-30 NOTE — LETTER
8/30/2021    Patient: Jesus Gregory   YOB: 1980   Date of Visit: 8/30/2021     Bala Echavarria MD  [address] [address]  Via Fax: [phone]    Dear Bala Echavarria MD,      Thank you for referring Ms. Juan Randle to [address] for evaluation. My notes for this consultation are attached. If you have questions, please do not hesitate to call me. I look forward to following your patient along with you.       Sincerely,    Alec Puente MD

## 2023-12-08 ENCOUNTER — OFFICE VISIT (OUTPATIENT)
Age: 43
End: 2023-12-08
Payer: COMMERCIAL

## 2023-12-08 VITALS
WEIGHT: 192 LBS | TEMPERATURE: 98.3 F | HEIGHT: 62 IN | OXYGEN SATURATION: 98 % | HEART RATE: 76 BPM | BODY MASS INDEX: 35.33 KG/M2

## 2023-12-08 DIAGNOSIS — M54.50 LUMBAR PAIN: Primary | ICD-10-CM

## 2023-12-08 DIAGNOSIS — M51.37 DDD (DEGENERATIVE DISC DISEASE), LUMBOSACRAL: ICD-10-CM

## 2023-12-08 DIAGNOSIS — M47.817 LUMBOSACRAL SPONDYLOSIS WITHOUT MYELOPATHY: ICD-10-CM

## 2023-12-08 DIAGNOSIS — M54.16 LUMBAR NEURITIS: ICD-10-CM

## 2023-12-08 PROCEDURE — 99214 OFFICE O/P EST MOD 30 MIN: CPT | Performed by: PHYSICAL MEDICINE & REHABILITATION

## 2023-12-08 PROCEDURE — 72100 X-RAY EXAM L-S SPINE 2/3 VWS: CPT | Performed by: PHYSICAL MEDICINE & REHABILITATION

## 2023-12-08 RX ORDER — METHYLPREDNISOLONE 4 MG/1
TABLET ORAL
Qty: 1 KIT | Refills: 0 | Status: SHIPPED | OUTPATIENT
Start: 2023-12-08

## 2023-12-08 RX ORDER — GABAPENTIN 300 MG/1
300 CAPSULE ORAL 3 TIMES DAILY
Qty: 90 CAPSULE | Refills: 1 | Status: SHIPPED | OUTPATIENT
Start: 2023-12-08 | End: 2024-02-06

## 2023-12-08 RX ORDER — MELOXICAM 15 MG/1
15 TABLET ORAL DAILY
Qty: 15 TABLET | Refills: 0 | Status: SHIPPED | OUTPATIENT
Start: 2023-12-08

## 2023-12-08 RX ORDER — ACETAMINOPHEN 500 MG
500 TABLET ORAL EVERY 6 HOURS PRN
COMMUNITY

## 2023-12-08 NOTE — PROGRESS NOTES
[facility]  [address]  [address]  [place], [place]  Tel: [phone]  Fax: [phone]          PROGRESS NOTE      HISTORY OF PRESENT ILLNESS:  The patient is a 39 y.o. female and was seen today for follow up of low back pain radiating to the BLE(R=L) (low back>BLE) in a S1 distribution to the mid thighs. Patient says she has been taking care of her mom recently because her mom has had 4 surgeries within the past year. Patient notes her pain started getting worse in the beginning of 2023. Patient denies injury. Patient says her pain is progressive in nature. Previously seen for chronic intermittent low back pain occasionally radiating into the BLE in a S1 distribution to the mid-thighs x 2012 after a sneeze, more constant x 2016. Her pain is exacerbated by prolonged sitting and heavy lifting. Additionally, pt reports pain and paraesthesias with discoloration in the digits of the BUE. Pt received a lumbar spinal injection in 2012 at the [facility] with benefit x 1 year. Pt received a lumbar spinal injection in 2016 by Dr. Aline Phalen without benefit. Pt completed PT in 2016 with benefit. Pt denies change in bowel or bladder habits. Her pain is exacerbated by lifting and slouching. Patient denies DM. Patient reports that to her knowledge her kidneys function properly. Patient denies recent fevers, weight loss, rashes, or skin sores. No stomach ulcers or bleeding disorders . Patient denies recent injections. Patient denies hx of spinal surgery. Patient does not do her HEP. Patient has taken tylenol, Advil, and Ice with benefit. Patient says to her knowledge she is not pregnant, trying to become pregnant, or breast feeding at this time. The patient is RHD. PmHx of  back pain, endometriosis, migraine HA's, PCOS. Note from Dr. Daniel Lopez dated 4/19/2021 indicating patient was seen with c/o myalgias. Recently treated with a 10 days course for lymes disease.

## 2024-01-22 ENCOUNTER — OFFICE VISIT (OUTPATIENT)
Age: 44
End: 2024-01-22
Payer: COMMERCIAL

## 2024-01-22 VITALS
WEIGHT: 182 LBS | SYSTOLIC BLOOD PRESSURE: 135 MMHG | DIASTOLIC BLOOD PRESSURE: 79 MMHG | TEMPERATURE: 97.9 F | HEART RATE: 73 BPM | HEIGHT: 62 IN | OXYGEN SATURATION: 99 % | BODY MASS INDEX: 33.49 KG/M2

## 2024-01-22 DIAGNOSIS — M54.16 LUMBAR NEURITIS: ICD-10-CM

## 2024-01-22 DIAGNOSIS — M51.37 DDD (DEGENERATIVE DISC DISEASE), LUMBOSACRAL: ICD-10-CM

## 2024-01-22 DIAGNOSIS — M47.817 LUMBOSACRAL SPONDYLOSIS WITHOUT MYELOPATHY: ICD-10-CM

## 2024-01-22 DIAGNOSIS — M54.50 LUMBAR PAIN: Primary | ICD-10-CM

## 2024-01-22 PROCEDURE — 99214 OFFICE O/P EST MOD 30 MIN: CPT | Performed by: PHYSICAL MEDICINE & REHABILITATION

## 2024-01-22 RX ORDER — GABAPENTIN 600 MG/1
600 TABLET ORAL 3 TIMES DAILY
Qty: 90 TABLET | Refills: 1 | Status: SHIPPED | OUTPATIENT
Start: 2024-01-22 | End: 2024-03-22

## 2024-01-22 NOTE — PROGRESS NOTES
remains unchanged. She rates her pain 0-5/10, previously 1-8/10. Patient says that overall her pain is a little better when compared to the last office visit. She is compliant with her HEP daily. Pt tolerates MDP with benefit. Pt tolerates Gabapentin 300 mg TID with benefit. Patient says to her knowledge she is not pregnant, trying to become pregnant, or breast feeding at this time. She denies changes in bowel or bladder habits.        reviewed. Body mass index is 33.29 kg/m².    PCP: Jose A Dorantes MD      Past Medical History:   Diagnosis Date    Anxiety     Back pain     Endometriosis     Irregular menses     Migraines     PCOS (polycystic ovarian syndrome)     Pelvic pain     Sleep apnea        Social History     Socioeconomic History    Marital status:      Spouse name: None    Number of children: None    Years of education: None    Highest education level: None   Tobacco Use    Smoking status: Never    Smokeless tobacco: Never   Substance and Sexual Activity    Alcohol use: Yes     Alcohol/week: 1.0 standard drink of alcohol    Drug use: No       Current Outpatient Medications   Medication Sig Dispense Refill    acetaminophen (TYLENOL) 500 MG tablet Take 1 tablet by mouth every 6 hours as needed for Pain      gabapentin (NEURONTIN) 300 MG capsule Take 1 capsule by mouth 3 times daily for 60 days. Max Daily Amount: 900 mg 90 capsule 1     No current facility-administered medications for this visit.       Allergies   Allergen Reactions    Penicillins Other (See Comments)     Facial swelling          PHYSICAL EXAMINATION    /79 (Site: Right Upper Arm, Position: Sitting, Cuff Size: Medium Adult)   Pulse 73   Temp 97.9 °F (36.6 °C) (Temporal)   Ht 1.575 m (5' 2\")   Wt 82.6 kg (182 lb)   SpO2 99%   BMI 33.29 kg/m²       CONSTITUTIONAL: NAD, A&O x 3    Ambulates without an assistive device.    MOTOR:  Straight Leg Raise: Negative, Bilaterally         Hip Flex Knee Ext Knee Flex Ankle DF GTE

## 2024-03-06 ENCOUNTER — OFFICE VISIT (OUTPATIENT)
Age: 44
End: 2024-03-06
Payer: COMMERCIAL

## 2024-03-06 VITALS
WEIGHT: 187 LBS | BODY MASS INDEX: 34.41 KG/M2 | TEMPERATURE: 98.5 F | OXYGEN SATURATION: 100 % | HEIGHT: 62 IN | HEART RATE: 67 BPM

## 2024-03-06 DIAGNOSIS — M47.817 LUMBOSACRAL SPONDYLOSIS WITHOUT MYELOPATHY: Primary | ICD-10-CM

## 2024-03-06 DIAGNOSIS — M54.50 LUMBAR PAIN: ICD-10-CM

## 2024-03-06 DIAGNOSIS — M54.16 LUMBAR NEURITIS: ICD-10-CM

## 2024-03-06 DIAGNOSIS — M51.37 DDD (DEGENERATIVE DISC DISEASE), LUMBOSACRAL: ICD-10-CM

## 2024-03-06 PROCEDURE — 99214 OFFICE O/P EST MOD 30 MIN: CPT | Performed by: PHYSICAL MEDICINE & REHABILITATION

## 2024-03-06 RX ORDER — NORETHINDRONE ACETATE AND ETHINYL ESTRADIOL 1MG-20(21)
1 KIT ORAL DAILY
COMMUNITY
Start: 2024-02-01

## 2024-03-06 RX ORDER — GABAPENTIN 600 MG/1
600 TABLET ORAL 3 TIMES DAILY
Qty: 270 TABLET | Refills: 0 | Status: SHIPPED | OUTPATIENT
Start: 2024-03-06 | End: 2024-06-04

## 2024-03-06 NOTE — PROGRESS NOTES
[facility]  [address] [facility] [address]  [place] [id number]  Tel: [phone]  Fax: [phone]          PROGRESS NOTE      HISTORY OF PRESENT ILLNESS:  The patient is a 43 y.o. female and was seen today for follow up of low back pain radiating to the BLE(R=L) (low back>BLE) in a S1 distribution to the mid thighs. Patient says she has been taking care of her mom recently because her mom has had 4 surgeries within the past year. Patient notes her pain started getting worse in the beginning of 2023. Patient denies injury. Patient says her pain is progressive in nature. Previously seen for chronic intermittent low back pain occasionally radiating into the BLE in a S1 distribution to the mid-thighs x 2012 after a sneeze, more constant x 2016.  Her pain is exacerbated by prolonged sitting and heavy lifting. Additionally, pt reports pain and paraesthesias with discoloration in the digits of the BUE. Pt received a lumbar spinal injection in 2012 at the [facility] with benefit x 1 year. Pt received a lumbar spinal injection in 2016 by Dr. Mendoza without benefit. Pt completed PT in 2016 with benefit. Pt denies change in bowel or bladder habits. Her pain is exacerbated by lifting and slouching. Patient denies DM. Patient reports that to her knowledge her kidneys function properly. Patient denies recent fevers, weight loss, rashes, or skin sores. No stomach ulcers or bleeding disorders .Patient denies recent injections. Patient denies hx of spinal surgery. Patient does not do her HEP. Patient has taken tylenol, Advil, and Ice with benefit. Patient says to her knowledge she is not pregnant, trying to become pregnant, or breast feeding at this time. The patient is RHD. PmHx of  back pain, endometriosis, migraine HA's, PCOS. Note from Dr. Ale Oglesby dated 4/19/2021 indicating patient was seen with c/o myalgias. Recently treated with a 10 days course for lymes disease.

## 2024-06-05 ENCOUNTER — OFFICE VISIT (OUTPATIENT)
Age: 44
End: 2024-06-05
Payer: COMMERCIAL

## 2024-06-05 VITALS
WEIGHT: 197 LBS | BODY MASS INDEX: 36.25 KG/M2 | HEART RATE: 82 BPM | HEIGHT: 62 IN | OXYGEN SATURATION: 99 % | TEMPERATURE: 98 F

## 2024-06-05 DIAGNOSIS — M54.50 LUMBAR PAIN: ICD-10-CM

## 2024-06-05 DIAGNOSIS — M54.16 LUMBAR NEURITIS: ICD-10-CM

## 2024-06-05 DIAGNOSIS — M47.817 LUMBOSACRAL SPONDYLOSIS WITHOUT MYELOPATHY: Primary | ICD-10-CM

## 2024-06-05 DIAGNOSIS — M51.37 DDD (DEGENERATIVE DISC DISEASE), LUMBOSACRAL: ICD-10-CM

## 2024-06-05 PROCEDURE — 99214 OFFICE O/P EST MOD 30 MIN: CPT | Performed by: PHYSICAL MEDICINE & REHABILITATION

## 2024-06-05 RX ORDER — MINOXIDIL 2.5 MG/1
TABLET ORAL
COMMUNITY
Start: 2024-05-23

## 2024-06-05 RX ORDER — SPIRONOLACTONE 25 MG/1
1 TABLET ORAL
COMMUNITY
Start: 2024-05-23 | End: 2024-09-20

## 2024-06-05 RX ORDER — ESCITALOPRAM OXALATE 10 MG/1
10 TABLET ORAL DAILY
COMMUNITY
Start: 2024-04-30

## 2024-06-05 RX ORDER — GABAPENTIN 800 MG/1
800 TABLET ORAL 3 TIMES DAILY
Qty: 90 TABLET | Refills: 2 | Status: SHIPPED | OUTPATIENT
Start: 2024-06-05 | End: 2024-09-03

## 2024-06-05 RX ORDER — AZELASTINE 1 MG/ML
SPRAY, METERED NASAL
COMMUNITY
Start: 2024-04-09

## 2024-06-05 RX ORDER — IPRATROPIUM BROMIDE 21 UG/1
SPRAY, METERED NASAL
COMMUNITY
Start: 2024-04-09 | End: 2024-07-08

## 2024-06-05 RX ORDER — MONTELUKAST SODIUM 10 MG/1
10 TABLET ORAL DAILY
COMMUNITY
Start: 2024-04-09

## 2024-06-05 NOTE — PROGRESS NOTES
followed by PCP. Note from Dr. Ale Oglesby dated 4/19/2021 indicating patient was seen with c/o myalgias. Recently treated with a 10 days course for lymes disease. started  her on diclofenac. Indicated pt had a positive ABEBE. Indicated she has h/o major depressive disorder. Note from Dr. Ale Oglesby dated 5/4/2021  indicating patient was seen with c/o back pain. Pt had positive ABEBE but no CTD. Indicated she does not think she has an underlying connective tissue disorder. Pt received 2 spinal injections by Dr. Mendoza with improvement. First injection was beneficial  for several months. Pt reports the only thing that provides relief is hydrocodone.  Note from Dr. Mendoza dated 10/4/2013 indicating patient was seen with c/o left buttocks pain anterior thigh. [facility]. Previous  epidurals at [facility] with benefit x 6 months. Note from Dr. Mendoza dated 10/14/2013 indicating patient underwent caudal block. Note from Dr. Mendoza dated 10/28/2013 indicating patient was seen for f/u epidural steroid injection #1. Symptoms are  80% better. Note from Dr. Mendoza dated 7/30/2015 indicating patient was seen with c/o lower back pain. Good response to previous epidurals. Too traumatic to consider doing more. Opted not to pursue additional treatment. MRI showed mild disc space narrowing  without stenosis at L3-4 and L4-5.  Lumbar spine plain films dated 5/19/2021. 2 views: AP and lateral. Revealed: Mild disc space narrowing  at L3-4, L4-5, and L5-S1. Small anterior osteophytes noted at L2 and L3. No acute pathology identified. Lumbar spine plain films dated 12/8/2023. 2 views: AP and Lateral. revealed: Age appropriate degenerative changes. No acute pathology identified. At her last clinical appointment,  She is not interested in surgery or injection at this time.  I discussed increasing her neuropathic medication, pt declined. She wished to continue with the current treatment plan. I will refill her

## 2024-07-30 NOTE — PROGRESS NOTES
[facility]  [address] [facility] [address]  [place] [id number]  Tel: [phone]  Fax: [phone]          PROGRESS NOTE      HISTORY OF PRESENT ILLNESS:  The patient is a 44 y.o. female and was seen today for follow up of mid to low back pain radiating to the BLE (R>L) (low back>>BLE) in a S1 distribution to the mid thighs. Previously seen for low back pain radiating to the BLE (R>L) (BLE>low back) in a S1 distribution to the mid thighs. Previously, pt c/o low back pain radiating to the BLE(R=L) (low back>BLE) in a S1 distribution to the mid thighs. Patient says she has been taking care of her mom recently because her mom has had 4 surgeries within the past year. Patient notes her pain started getting worse in the beginning of 2023. Patient denies injury. Patient says her pain is progressive in nature. Previously seen for chronic intermittent low back pain occasionally radiating into the BLE in a S1 distribution to the mid-thighs x 2012 after a sneeze, more constant x 2016.  Her pain is exacerbated by prolonged sitting and heavy lifting. Additionally, pt reports pain and paraesthesias with discoloration in the digits of the BUE. Pt received a lumbar spinal injection in 2012 at the [facility] with benefit x 1 year. Pt received a lumbar spinal injection in 2016 by Dr. Mendoza without benefit. Pt completed PT in 2016 with benefit. Pt denies change in bowel or bladder habits. Her pain is exacerbated by lifting and slouching. Patient denies DM. Patient reports that to her knowledge her kidneys function properly. Patient denies recent fevers, weight loss, rashes, or skin sores. No stomach ulcers or bleeding disorders .Patient denies recent injections. Patient denies hx of spinal surgery. Patient does not do her HEP. Patient has taken tylenol, Advil, and Ice with benefit. Patient says to her knowledge she is not pregnant, trying to become pregnant, or breast feeding at

## 2024-07-31 ENCOUNTER — OFFICE VISIT (OUTPATIENT)
Age: 44
End: 2024-07-31
Payer: COMMERCIAL

## 2024-07-31 VITALS
TEMPERATURE: 98 F | OXYGEN SATURATION: 99 % | BODY MASS INDEX: 37.36 KG/M2 | HEIGHT: 62 IN | HEART RATE: 71 BPM | WEIGHT: 203 LBS

## 2024-07-31 DIAGNOSIS — M47.817 LUMBOSACRAL SPONDYLOSIS WITHOUT MYELOPATHY: Primary | ICD-10-CM

## 2024-07-31 DIAGNOSIS — M54.50 LUMBAR PAIN: ICD-10-CM

## 2024-07-31 DIAGNOSIS — M51.37 DDD (DEGENERATIVE DISC DISEASE), LUMBOSACRAL: ICD-10-CM

## 2024-07-31 DIAGNOSIS — M54.16 LUMBAR NEURITIS: ICD-10-CM

## 2024-07-31 PROCEDURE — 99214 OFFICE O/P EST MOD 30 MIN: CPT | Performed by: PHYSICAL MEDICINE & REHABILITATION

## 2024-07-31 RX ORDER — LIDOCAINE 50 MG/G
1 PATCH TOPICAL DAILY
COMMUNITY

## 2024-07-31 RX ORDER — MIRTAZAPINE 30 MG/1
30 TABLET, FILM COATED ORAL
COMMUNITY

## 2024-07-31 RX ORDER — GABAPENTIN 800 MG/1
800 TABLET ORAL 3 TIMES DAILY
Qty: 270 TABLET | Refills: 0 | Status: SHIPPED | OUTPATIENT
Start: 2024-07-31 | End: 2024-10-29

## 2024-07-31 RX ORDER — HYDROCODONE BITARTRATE AND ACETAMINOPHEN 5; 325 MG/1; MG/1
TABLET ORAL
COMMUNITY

## 2024-07-31 RX ORDER — HYDROCHLOROTHIAZIDE 25 MG/1
25 TABLET ORAL DAILY
COMMUNITY

## 2024-11-04 ENCOUNTER — OFFICE VISIT (OUTPATIENT)
Age: 44
End: 2024-11-04
Payer: COMMERCIAL

## 2024-11-04 VITALS
OXYGEN SATURATION: 96 % | BODY MASS INDEX: 39.56 KG/M2 | HEIGHT: 62 IN | WEIGHT: 215 LBS | TEMPERATURE: 98 F | HEART RATE: 76 BPM

## 2024-11-04 DIAGNOSIS — M51.372 DEGENERATION OF INTERVERTEBRAL DISC OF LUMBOSACRAL REGION WITH DISCOGENIC BACK PAIN AND LOWER EXTREMITY PAIN: ICD-10-CM

## 2024-11-04 DIAGNOSIS — M54.16 LUMBAR NEURITIS: ICD-10-CM

## 2024-11-04 DIAGNOSIS — M54.50 LUMBAR PAIN: ICD-10-CM

## 2024-11-04 DIAGNOSIS — M47.817 LUMBOSACRAL SPONDYLOSIS WITHOUT MYELOPATHY: Primary | ICD-10-CM

## 2024-11-04 PROCEDURE — 99214 OFFICE O/P EST MOD 30 MIN: CPT | Performed by: PHYSICAL MEDICINE & REHABILITATION

## 2024-11-04 RX ORDER — FLUTICASONE FUROATE AND VILANTEROL TRIFENATATE 100; 25 UG/1; UG/1
1 POWDER RESPIRATORY (INHALATION) DAILY
COMMUNITY

## 2024-11-04 RX ORDER — GABAPENTIN 800 MG/1
800 TABLET ORAL 3 TIMES DAILY
Qty: 270 TABLET | Refills: 1 | Status: SHIPPED | OUTPATIENT
Start: 2024-11-04 | End: 2025-05-03

## 2024-11-04 RX ORDER — ESCITALOPRAM OXALATE 20 MG/1
20 TABLET ORAL DAILY
COMMUNITY
Start: 2024-11-02

## 2024-11-04 NOTE — PROGRESS NOTES
[facility]  [address] [facility] [address]  [place] [address]  Tel: [phone]  Fax: [phone]          PROGRESS NOTE      HISTORY OF PRESENT ILLNESS:  The patient is a 44 y.o. female and was seen today for follow up of thoracic and lumbar spinal pain (low back>>BLE) with radicular symptoms into the BLE (R>L) in a S1 distribution to the mid thighs. Previously seen for mid to low back pain radiating to the BLE (R>L) (low back>>BLE) in a S1 distribution to the mid thighs. Previously seen for low back pain radiating to the BLE (R>L) (BLE>low back) in a S1 distribution to the mid thighs. Previously, pt c/o low back pain radiating to the BLE(R=L) (low back>BLE) in a S1 distribution to the mid thighs. Patient says she has been taking care of her mom recently because her mom has had 4 surgeries within the past year. Patient notes her pain started getting worse in the beginning of 2023. Patient denies injury. Patient says her pain is progressive in nature. Previously seen for chronic intermittent low back pain occasionally radiating into the BLE in a S1 distribution to the mid-thighs x 2012 after a sneeze, more constant x 2016.  Her pain is exacerbated by prolonged sitting and heavy lifting. Additionally, pt reports pain and paraesthesias with discoloration in the digits of the BUE. Pt received a lumbar spinal injection in 2012 at the [facility] with benefit x 1 year. Pt received a lumbar spinal injection in 2016 by Dr. Mendoza without benefit. Pt completed PT in 2016 with benefit. Pt denies change in bowel or bladder habits. Her pain is exacerbated by lifting and slouching. Patient denies DM. Patient reports that to her knowledge her kidneys function properly. Patient denies recent fevers, weight loss, rashes, or skin sores. No stomach ulcers or bleeding disorders .Patient denies recent injections. Patient denies hx of spinal surgery. Patient does not do her HEP.

## 2025-05-01 DIAGNOSIS — M51.372 DEGENERATION OF INTERVERTEBRAL DISC OF LUMBOSACRAL REGION WITH DISCOGENIC BACK PAIN AND LOWER EXTREMITY PAIN: ICD-10-CM

## 2025-05-01 DIAGNOSIS — M47.817 LUMBOSACRAL SPONDYLOSIS WITHOUT MYELOPATHY: Primary | ICD-10-CM

## 2025-05-01 DIAGNOSIS — M54.16 LUMBAR NEURITIS: ICD-10-CM

## 2025-05-01 DIAGNOSIS — M54.50 LUMBAR PAIN: ICD-10-CM

## 2025-05-01 NOTE — TELEPHONE ENCOUNTER
I tried calling the pt to discuss but there was no answer.   The medications given by the ER are only short term medications and not something you would continue taking.   I will forward to Dr. Phillips to see if he would like to continue the medications. Pt is scheduled to follow up with you 5/5/2025. Last seen by you 11/4/2024  I will try reaching out to the pt again tomorrow morning to discuss.

## 2025-05-01 NOTE — TELEPHONE ENCOUNTER
Patient called. She went to [facility] on 4/28 for a lower back pain flare. They did xrays and they looked fine, but they gave her Prednisone 20 mg 3x daily , Flexeril 5mg 3x daily, and Norco 5-325 mg every four hours enough for a five days but she states she will be out before the weekend. She is asking if Dr. Phillips would be willing to give her a continuation of the prescriptions to get her to her appt on 5/5. If possible she would like the prescriptions sent to [address] [phone] -  [phone].    Please review and advise patient, [phone]

## 2025-05-02 RX ORDER — CYCLOBENZAPRINE HCL 5 MG
5 TABLET ORAL 3 TIMES DAILY PRN
Qty: 40 TABLET | Refills: 0 | OUTPATIENT
Start: 2025-05-02 | End: 2025-05-15

## 2025-05-02 NOTE — TELEPHONE ENCOUNTER
Patient asked during reminder call if this medication would be called in. Patient was advised the flexeril would be sent. Patient was alsop informed Dr Phillips has left for the day and may or may not be able to send the meds until Monday.    Meds pended for sig.

## 2025-05-05 ENCOUNTER — OFFICE VISIT (OUTPATIENT)
Age: 45
End: 2025-05-05
Payer: COMMERCIAL

## 2025-05-05 VITALS
TEMPERATURE: 98 F | BODY MASS INDEX: 40.3 KG/M2 | HEART RATE: 90 BPM | OXYGEN SATURATION: 99 % | HEIGHT: 62 IN | WEIGHT: 219 LBS

## 2025-05-05 DIAGNOSIS — M54.16 LUMBAR NEURITIS: ICD-10-CM

## 2025-05-05 DIAGNOSIS — M51.372 DEGENERATION OF INTERVERTEBRAL DISC OF LUMBOSACRAL REGION WITH DISCOGENIC BACK PAIN AND LOWER EXTREMITY PAIN: ICD-10-CM

## 2025-05-05 DIAGNOSIS — M54.50 LUMBAR PAIN: ICD-10-CM

## 2025-05-05 DIAGNOSIS — M47.817 LUMBOSACRAL SPONDYLOSIS WITHOUT MYELOPATHY: Primary | ICD-10-CM

## 2025-05-05 PROCEDURE — 99214 OFFICE O/P EST MOD 30 MIN: CPT | Performed by: PHYSICAL MEDICINE & REHABILITATION

## 2025-05-05 RX ORDER — CYCLOBENZAPRINE HCL 5 MG
5 TABLET ORAL 3 TIMES DAILY
COMMUNITY
Start: 2025-04-28

## 2025-05-05 RX ORDER — CYCLOBENZAPRINE HCL 5 MG
5 TABLET ORAL 2 TIMES DAILY PRN
Qty: 40 TABLET | Refills: 0 | Status: SHIPPED | OUTPATIENT
Start: 2025-05-05

## 2025-05-05 RX ORDER — GABAPENTIN 800 MG/1
800 TABLET ORAL 3 TIMES DAILY
Qty: 270 TABLET | Refills: 0 | Status: SHIPPED | OUTPATIENT
Start: 2025-05-05 | End: 2025-08-03

## 2025-05-05 RX ORDER — PHENTERMINE HYDROCHLORIDE 15 MG/1
15 CAPSULE ORAL DAILY
COMMUNITY
Start: 2025-04-30

## 2025-05-05 RX ORDER — QUETIAPINE FUMARATE 25 MG/1
25 TABLET, FILM COATED ORAL
COMMUNITY

## 2025-05-05 NOTE — PROGRESS NOTES
[facility]  [address]  [place] [address]  Tel: [phone]  Fax: [phone]          PROGRESS NOTE      HISTORY OF PRESENT ILLNESS:  The patient is a 45 y.o. female and was seen today for follow up of cervical, thoracic, and lumbar spine pain (L>>C and T) with radicular symptoms into the RLE extending in a S1 distribution to the knee. Previously seen for thoracic and lumbar spinal pain (low back>>BLE) with radicular symptoms into the BLE (R>L) in a S1 distribution to the mid thighs. Previously seen for mid to low back pain radiating to the BLE (R>L) (low back>>BLE) in a S1 distribution to the mid thighs. Previously seen for low back pain radiating to the BLE (R>L) (BLE>low back) in a S1 distribution to the mid thighs. Previously, pt c/o low back pain radiating to the BLE(R=L) (low back>BLE) in a S1 distribution to the mid thighs. Patient says she has been taking care of her mom recently because her mom has had 4 surgeries within the past year. Patient notes her pain started getting worse in the beginning of 2023. Patient denies injury. Patient says her pain is progressive in nature. Previously seen for chronic intermittent low back pain occasionally radiating into the BLE in a S1 distribution to the mid-thighs x 2012 after a sneeze, more constant x 2016.  Her pain is exacerbated by prolonged sitting and heavy lifting. Additionally, pt reports pain and paraesthesias with discoloration in the digits of the BUE. Pt received a lumbar spinal injection in 2012 at the [facility] with benefit x 1 year. Pt received a lumbar spinal injection in 2016 by Dr. Mendoza without benefit. Pt completed PT in 2016 with benefit. Pt denies change in bowel or bladder habits. Her pain is exacerbated by lifting and slouching. Patient denies DM. Patient reports that to her knowledge her kidneys function properly. Patient denies recent fevers, weight loss, rashes, or skin

## 2025-05-08 ENCOUNTER — TELEPHONE (OUTPATIENT)
Age: 45
End: 2025-05-08

## 2025-05-08 ENCOUNTER — PATIENT MESSAGE (OUTPATIENT)
Age: 45
End: 2025-05-08

## 2025-05-08 NOTE — TELEPHONE ENCOUNTER
Mila with [facility] called requesting we send the pt's MRI LUMBAR SPINE WO CONTRAST order to them so they can get pt bettina'd.    FAX: [phone]  callback# [phone]

## 2025-05-19 DIAGNOSIS — M47.817 LUMBOSACRAL SPONDYLOSIS WITHOUT MYELOPATHY: ICD-10-CM

## 2025-05-19 DIAGNOSIS — M54.16 LUMBAR NEURITIS: ICD-10-CM

## 2025-05-19 DIAGNOSIS — M54.50 LUMBAR PAIN: ICD-10-CM

## 2025-05-19 DIAGNOSIS — M51.372 DEGENERATION OF INTERVERTEBRAL DISC OF LUMBOSACRAL REGION WITH DISCOGENIC BACK PAIN AND LOWER EXTREMITY PAIN: ICD-10-CM

## 2025-06-30 ENCOUNTER — OFFICE VISIT (OUTPATIENT)
Age: 45
End: 2025-06-30
Payer: COMMERCIAL

## 2025-06-30 VITALS
WEIGHT: 214 LBS | OXYGEN SATURATION: 98 % | BODY MASS INDEX: 39.38 KG/M2 | HEIGHT: 62 IN | TEMPERATURE: 98 F | HEART RATE: 91 BPM

## 2025-06-30 DIAGNOSIS — M54.16 LUMBAR NEURITIS: ICD-10-CM

## 2025-06-30 DIAGNOSIS — M51.372 DEGENERATION OF INTERVERTEBRAL DISC OF LUMBOSACRAL REGION WITH DISCOGENIC BACK PAIN AND LOWER EXTREMITY PAIN: ICD-10-CM

## 2025-06-30 DIAGNOSIS — M54.50 LUMBAR PAIN: ICD-10-CM

## 2025-06-30 DIAGNOSIS — M47.817 LUMBOSACRAL SPONDYLOSIS WITHOUT MYELOPATHY: Primary | ICD-10-CM

## 2025-06-30 PROCEDURE — 99214 OFFICE O/P EST MOD 30 MIN: CPT | Performed by: PHYSICAL MEDICINE & REHABILITATION

## 2025-06-30 RX ORDER — PREGABALIN 25 MG/1
25 CAPSULE ORAL 2 TIMES DAILY
Qty: 60 CAPSULE | Refills: 1 | Status: CANCELLED | OUTPATIENT
Start: 2025-06-30 | End: 2025-08-29

## 2025-06-30 RX ORDER — PREGABALIN 75 MG/1
75 CAPSULE ORAL 2 TIMES DAILY
Qty: 60 CAPSULE | Refills: 2 | Status: SHIPPED | OUTPATIENT
Start: 2025-06-30 | End: 2025-09-28

## 2025-06-30 NOTE — PROGRESS NOTES
current treatment plan. The patient is Neurologically intact. I will see the patient back in 6 months or earlier if needed. Previously seen for She rates her pain  0-3 /10, previously 0-4/10. Patient says that overall her pain is better when compared to the last office visit. She says to her knowledge she is not pregnant, trying to become pregnant, or breast feeding at this time. She denies changes in bowel or bladder habits. She is non compliant with her daily HEP, she does them every other day. She reports that she is still taking her Gabapentin as prescribed. She reports that she is going through Perimenopause. She is not interested in surgery or injection at this time. I recommended she increase the frequency of her HEP to daily. She wished to continue with the current treatment plan. I informed the pt that if her pain continues to improved, we will begin weaning her off of her neuropathic medication. I refilled her Gabapentin 800 mg TID. The patient is Neurologically intact. I will see the patient back in 6 months or earlier if needed. She rates her pain 3-10/10, previously 0-3/10. Patient says that overall her pain is worse when compared to the last office visit. She denies changes in bowel or bladder habits. She is compliant with her daily HEP. Reports her flare of pain started the week prior to her ER visit on 4/28/2025 while doing yard work. Patient reports the Prednisone rx'd at her ER visit provided her with temporary relief. Patient reports that to her knowledge her kidneys function properly. GFR of >60 on 4/23/2025. She says to her knowledge she is not pregnant, trying to become pregnant, or breast feeding at this time. ER note from [facility] dated 4/28/2025 indicating she presented for Back pain that is most likely musculoskeletal. Notes she has been doing colonscopy bowel prep over last 24 hours and the getting up and down from the toilet has aggrevated her back, now with spasm and pain

## 2025-08-06 DIAGNOSIS — M54.16 LUMBAR NEURITIS: ICD-10-CM

## 2025-08-06 DIAGNOSIS — M51.372 DEGENERATION OF INTERVERTEBRAL DISC OF LUMBOSACRAL REGION WITH DISCOGENIC BACK PAIN AND LOWER EXTREMITY PAIN: ICD-10-CM

## 2025-08-06 DIAGNOSIS — M47.817 LUMBOSACRAL SPONDYLOSIS WITHOUT MYELOPATHY: ICD-10-CM

## 2025-08-06 DIAGNOSIS — M54.50 LUMBAR PAIN: ICD-10-CM

## 2025-08-07 RX ORDER — CYCLOBENZAPRINE HCL 5 MG
5 TABLET ORAL 2 TIMES DAILY PRN
Qty: 40 TABLET | Refills: 0 | Status: SHIPPED | OUTPATIENT
Start: 2025-08-07

## 2025-08-25 ENCOUNTER — OFFICE VISIT (OUTPATIENT)
Age: 45
End: 2025-08-25
Payer: COMMERCIAL

## 2025-08-25 VITALS
BODY MASS INDEX: 38.09 KG/M2 | WEIGHT: 207 LBS | OXYGEN SATURATION: 98 % | TEMPERATURE: 98 F | HEIGHT: 62 IN | HEART RATE: 73 BPM

## 2025-08-25 DIAGNOSIS — M54.50 LUMBAR PAIN: ICD-10-CM

## 2025-08-25 DIAGNOSIS — M51.372 DEGENERATION OF INTERVERTEBRAL DISC OF LUMBOSACRAL REGION WITH DISCOGENIC BACK PAIN AND LOWER EXTREMITY PAIN: ICD-10-CM

## 2025-08-25 DIAGNOSIS — M54.16 LUMBAR NEURITIS: ICD-10-CM

## 2025-08-25 DIAGNOSIS — M47.817 LUMBOSACRAL SPONDYLOSIS WITHOUT MYELOPATHY: Primary | ICD-10-CM

## 2025-08-25 PROCEDURE — 99214 OFFICE O/P EST MOD 30 MIN: CPT | Performed by: PHYSICAL MEDICINE & REHABILITATION
